# Patient Record
Sex: FEMALE | Race: WHITE | NOT HISPANIC OR LATINO
[De-identification: names, ages, dates, MRNs, and addresses within clinical notes are randomized per-mention and may not be internally consistent; named-entity substitution may affect disease eponyms.]

---

## 2017-09-29 PROBLEM — Z00.00 ENCOUNTER FOR PREVENTIVE HEALTH EXAMINATION: Status: ACTIVE | Noted: 2017-09-29

## 2017-10-03 ENCOUNTER — APPOINTMENT (OUTPATIENT)
Dept: CARDIOLOGY | Facility: CLINIC | Age: 37
End: 2017-10-03

## 2017-10-03 VITALS
SYSTOLIC BLOOD PRESSURE: 118 MMHG | BODY MASS INDEX: 39.43 KG/M2 | HEART RATE: 88 BPM | WEIGHT: 193 LBS | HEIGHT: 58.5 IN | DIASTOLIC BLOOD PRESSURE: 80 MMHG

## 2017-10-03 DIAGNOSIS — Z82.49 FAMILY HISTORY OF ISCHEMIC HEART DISEASE AND OTHER DISEASES OF THE CIRCULATORY SYSTEM: ICD-10-CM

## 2017-10-10 ENCOUNTER — APPOINTMENT (OUTPATIENT)
Dept: CARDIOLOGY | Facility: CLINIC | Age: 37
End: 2017-10-10

## 2017-11-02 ENCOUNTER — APPOINTMENT (OUTPATIENT)
Dept: CARDIOLOGY | Facility: CLINIC | Age: 37
End: 2017-11-02

## 2017-11-02 VITALS
WEIGHT: 196 LBS | HEIGHT: 58 IN | DIASTOLIC BLOOD PRESSURE: 80 MMHG | BODY MASS INDEX: 41.14 KG/M2 | SYSTOLIC BLOOD PRESSURE: 106 MMHG

## 2017-11-17 ENCOUNTER — MEDICATION RENEWAL (OUTPATIENT)
Age: 37
End: 2017-11-17

## 2018-12-06 ENCOUNTER — APPOINTMENT (OUTPATIENT)
Dept: CARDIOLOGY | Facility: CLINIC | Age: 38
End: 2018-12-06

## 2018-12-06 VITALS
SYSTOLIC BLOOD PRESSURE: 98 MMHG | HEART RATE: 78 BPM | BODY MASS INDEX: 42.19 KG/M2 | HEIGHT: 58 IN | WEIGHT: 201 LBS | DIASTOLIC BLOOD PRESSURE: 62 MMHG

## 2018-12-06 NOTE — REASON FOR VISIT
[Initial Evaluation] : an initial evaluation of [FreeTextEntry2] : htn and atypical chest pain [FreeTextEntry1] : htn\par sob\par obesity\par cardiac evaluation\par no new complaints\par echo shows normal lv function\par bp stable on hctz\par anxiety symptoms \par hyperventilating\par no syncope\par no palps

## 2019-08-09 ENCOUNTER — OUTPATIENT (OUTPATIENT)
Dept: OUTPATIENT SERVICES | Facility: HOSPITAL | Age: 39
LOS: 1 days | Discharge: HOME | End: 2019-08-09
Payer: COMMERCIAL

## 2019-08-09 DIAGNOSIS — N64.4 MASTODYNIA: ICD-10-CM

## 2019-08-09 DIAGNOSIS — N63.10 UNSPECIFIED LUMP IN THE RIGHT BREAST, UNSPECIFIED QUADRANT: ICD-10-CM

## 2019-08-09 PROCEDURE — 76642 ULTRASOUND BREAST LIMITED: CPT | Mod: 26,RT

## 2019-08-09 PROCEDURE — G0279: CPT | Mod: 26

## 2019-08-09 PROCEDURE — 77066 DX MAMMO INCL CAD BI: CPT | Mod: 26

## 2020-09-02 ENCOUNTER — APPOINTMENT (OUTPATIENT)
Dept: CARDIOLOGY | Facility: CLINIC | Age: 40
End: 2020-09-02
Payer: COMMERCIAL

## 2020-09-02 VITALS
BODY MASS INDEX: 40.72 KG/M2 | SYSTOLIC BLOOD PRESSURE: 120 MMHG | HEIGHT: 58 IN | HEART RATE: 73 BPM | TEMPERATURE: 97.4 F | WEIGHT: 194 LBS | DIASTOLIC BLOOD PRESSURE: 84 MMHG

## 2020-09-02 VITALS — HEIGHT: 58 IN

## 2020-09-02 VITALS — WEIGHT: 194 LBS | HEIGHT: 58 IN | TEMPERATURE: 97.3 F | BODY MASS INDEX: 40.72 KG/M2

## 2020-09-02 PROCEDURE — 99214 OFFICE O/P EST MOD 30 MIN: CPT

## 2020-09-02 PROCEDURE — 93000 ELECTROCARDIOGRAM COMPLETE: CPT

## 2020-09-02 NOTE — PHYSICAL EXAM
[Well Groomed] : well groomed [Normal Appearance] : normal appearance [General Appearance - Well Developed] : well developed [No Deformities] : no deformities [General Appearance - In No Acute Distress] : no acute distress [General Appearance - Well Nourished] : well nourished [Eyelids - No Xanthelasma] : the eyelids demonstrated no xanthelasmas [Normal Conjunctiva] : the conjunctiva exhibited no abnormalities [Normal Oral Mucosa] : normal oral mucosa [No Oral Pallor] : no oral pallor [No Oral Cyanosis] : no oral cyanosis [Normal Jugular Venous A Waves Present] : normal jugular venous A waves present [Normal Jugular Venous V Waves Present] : normal jugular venous V waves present [No Jugular Venous Yuen A Waves] : no jugular venous yuen A waves [Auscultation Breath Sounds / Voice Sounds] : lungs were clear to auscultation bilaterally [Exaggerated Use Of Accessory Muscles For Inspiration] : no accessory muscle use [Respiration, Rhythm And Depth] : normal respiratory rhythm and effort [Heart Sounds] : normal S1 and S2 [Murmurs] : no murmurs present [Heart Rate And Rhythm] : heart rate and rhythm were normal [Abdomen Tenderness] : non-tender [Abdomen Soft] : soft [Gait - Sufficient For Exercise Testing] : the gait was sufficient for exercise testing [Abdomen Mass (___ Cm)] : no abdominal mass palpated [Abnormal Walk] : normal gait [Nail Clubbing] : no clubbing of the fingernails [Petechial Hemorrhages (___cm)] : no petechial hemorrhages [Cyanosis, Localized] : no localized cyanosis [No Venous Stasis] : no venous stasis [Skin Color & Pigmentation] : normal skin color and pigmentation [] : no rash [Skin Lesions] : no skin lesions [No Xanthoma] : no  xanthoma was observed [No Skin Ulcers] : no skin ulcer [Affect] : the affect was normal [Oriented To Time, Place, And Person] : oriented to person, place, and time [Mood] : the mood was normal [No Anxiety] : not feeling anxious

## 2021-03-18 ENCOUNTER — OUTPATIENT (OUTPATIENT)
Dept: OUTPATIENT SERVICES | Facility: HOSPITAL | Age: 41
LOS: 1 days | Discharge: HOME | End: 2021-03-18
Payer: MEDICARE

## 2021-03-18 DIAGNOSIS — N64.4 MASTODYNIA: ICD-10-CM

## 2021-03-18 PROCEDURE — 77067 SCR MAMMO BI INCL CAD: CPT | Mod: 26

## 2021-03-18 PROCEDURE — 77063 BREAST TOMOSYNTHESIS BI: CPT | Mod: 26

## 2021-03-19 ENCOUNTER — OUTPATIENT (OUTPATIENT)
Dept: OUTPATIENT SERVICES | Facility: HOSPITAL | Age: 41
LOS: 1 days | Discharge: HOME | End: 2021-03-19
Payer: MEDICARE

## 2021-03-19 DIAGNOSIS — R92.2 INCONCLUSIVE MAMMOGRAM: ICD-10-CM

## 2021-03-19 PROCEDURE — 76641 ULTRASOUND BREAST COMPLETE: CPT | Mod: 26,50

## 2021-12-30 ENCOUNTER — OUTPATIENT (OUTPATIENT)
Dept: OUTPATIENT SERVICES | Facility: HOSPITAL | Age: 41
LOS: 1 days | Discharge: HOME | End: 2021-12-30
Payer: COMMERCIAL

## 2021-12-30 DIAGNOSIS — N64.4 MASTODYNIA: ICD-10-CM

## 2021-12-30 DIAGNOSIS — N63.10 UNSPECIFIED LUMP IN THE RIGHT BREAST, UNSPECIFIED QUADRANT: ICD-10-CM

## 2021-12-30 PROCEDURE — 77065 DX MAMMO INCL CAD UNI: CPT | Mod: 26,RT

## 2021-12-30 PROCEDURE — 76642 ULTRASOUND BREAST LIMITED: CPT | Mod: 26,RT

## 2021-12-30 PROCEDURE — G0279: CPT | Mod: 26

## 2022-08-24 ENCOUNTER — APPOINTMENT (OUTPATIENT)
Dept: CARDIOLOGY | Facility: CLINIC | Age: 42
End: 2022-08-24

## 2022-08-24 VITALS
DIASTOLIC BLOOD PRESSURE: 78 MMHG | BODY MASS INDEX: 39.67 KG/M2 | HEIGHT: 58 IN | SYSTOLIC BLOOD PRESSURE: 126 MMHG | HEART RATE: 90 BPM | WEIGHT: 189 LBS

## 2022-08-24 PROCEDURE — 93000 ELECTROCARDIOGRAM COMPLETE: CPT

## 2022-08-24 PROCEDURE — 99214 OFFICE O/P EST MOD 30 MIN: CPT | Mod: 25

## 2022-08-24 NOTE — DISCUSSION/SUMMARY
[FreeTextEntry1] : Return visit 4 months\par Echocardiogram\par A diary of blood pressure readings 2 or 3 times a day\par DC cardiac blood pressure medicines.

## 2022-08-24 NOTE — REASON FOR VISIT
[FreeTextEntry1] : htn\par sob\par obesity\par cardiac evaluation\par no new complaints\par echo shows normal lv function\par bp stable on hctz\par anxiety symptoms \par hyperventilating\par no syncope\par no palps\par \par The patient now is 6 weeks pregnant and advised cardiac follow-up\par She presently denies cardiac symptoms..\par Denies chest pain or exertional shortness of breath..\par \par Her blood pressure is normal and she desires to stop her hydrochlorothiazide\par \par EKG is normal sinus rhythm [Initial Evaluation] : an initial evaluation of [FreeTextEntry2] : htn and atypical chest pain

## 2022-08-24 NOTE — PHYSICAL EXAM
[Well Developed] : well developed [Well Nourished] : well nourished [No Acute Distress] : no acute distress [Normal Venous Pressure] : normal venous pressure [No Carotid Bruit] : no carotid bruit [Normal S1, S2] : normal S1, S2 [No Murmur] : no murmur [No Rub] : no rub [No Gallop] : no gallop [Clear Lung Fields] : clear lung fields [Good Air Entry] : good air entry [No Respiratory Distress] : no respiratory distress  [Soft] : abdomen soft [Non Tender] : non-tender [No Masses/organomegaly] : no masses/organomegaly [Normal Bowel Sounds] : normal bowel sounds [Normal Gait] : normal gait [No Edema] : no edema [No Cyanosis] : no cyanosis [No Clubbing] : no clubbing [No Varicosities] : no varicosities [No Rash] : no rash [No Skin Lesions] : no skin lesions [Moves all extremities] : moves all extremities [No Focal Deficits] : no focal deficits [Normal Speech] : normal speech [Alert and Oriented] : alert and oriented [Normal memory] : normal memory [General Appearance - Well Developed] : well developed [Normal Appearance] : normal appearance [Well Groomed] : well groomed [General Appearance - Well Nourished] : well nourished [No Deformities] : no deformities [General Appearance - In No Acute Distress] : no acute distress [Normal Conjunctiva] : the conjunctiva exhibited no abnormalities [Eyelids - No Xanthelasma] : the eyelids demonstrated no xanthelasmas [Normal Oral Mucosa] : normal oral mucosa [No Oral Pallor] : no oral pallor [No Oral Cyanosis] : no oral cyanosis [Normal Jugular Venous A Waves Present] : normal jugular venous A waves present [Normal Jugular Venous V Waves Present] : normal jugular venous V waves present [No Jugular Venous Yuen A Waves] : no jugular venous yuen A waves [Respiration, Rhythm And Depth] : normal respiratory rhythm and effort [Exaggerated Use Of Accessory Muscles For Inspiration] : no accessory muscle use [Auscultation Breath Sounds / Voice Sounds] : lungs were clear to auscultation bilaterally [Heart Rate And Rhythm] : heart rate and rhythm were normal [Heart Sounds] : normal S1 and S2 [Murmurs] : no murmurs present [Abdomen Soft] : soft [Abdomen Tenderness] : non-tender [Abdomen Mass (___ Cm)] : no abdominal mass palpated [Abnormal Walk] : normal gait [Gait - Sufficient For Exercise Testing] : the gait was sufficient for exercise testing [Nail Clubbing] : no clubbing of the fingernails [Cyanosis, Localized] : no localized cyanosis [Petechial Hemorrhages (___cm)] : no petechial hemorrhages [Skin Color & Pigmentation] : normal skin color and pigmentation [] : no rash [No Venous Stasis] : no venous stasis [Skin Lesions] : no skin lesions [No Skin Ulcers] : no skin ulcer [No Xanthoma] : no  xanthoma was observed [Oriented To Time, Place, And Person] : oriented to person, place, and time [Affect] : the affect was normal [Mood] : the mood was normal [No Anxiety] : not feeling anxious

## 2022-09-20 ENCOUNTER — APPOINTMENT (OUTPATIENT)
Dept: CARDIOLOGY | Facility: CLINIC | Age: 42
End: 2022-09-20

## 2022-10-24 ENCOUNTER — APPOINTMENT (OUTPATIENT)
Dept: CARDIOLOGY | Facility: CLINIC | Age: 42
End: 2022-10-24

## 2022-10-24 DIAGNOSIS — Z78.9 OTHER SPECIFIED HEALTH STATUS: ICD-10-CM

## 2022-10-24 PROCEDURE — 93306 TTE W/DOPPLER COMPLETE: CPT

## 2022-10-26 PROBLEM — Z78.9 NON-SMOKER: Status: ACTIVE | Noted: 2017-11-02

## 2022-12-13 ENCOUNTER — OUTPATIENT (OUTPATIENT)
Dept: OUTPATIENT SERVICES | Facility: HOSPITAL | Age: 42
LOS: 1 days | Discharge: HOME | End: 2022-12-13

## 2022-12-13 ENCOUNTER — RESULT CHARGE (OUTPATIENT)
Age: 42
End: 2022-12-13

## 2022-12-13 ENCOUNTER — APPOINTMENT (OUTPATIENT)
Dept: CARDIOLOGY | Facility: CLINIC | Age: 42
End: 2022-12-13

## 2022-12-13 VITALS
DIASTOLIC BLOOD PRESSURE: 60 MMHG | WEIGHT: 196 LBS | BODY MASS INDEX: 41.14 KG/M2 | HEART RATE: 89 BPM | HEIGHT: 58 IN | SYSTOLIC BLOOD PRESSURE: 110 MMHG

## 2022-12-13 DIAGNOSIS — N63.10 UNSPECIFIED LUMP IN THE RIGHT BREAST, UNSPECIFIED QUADRANT: ICD-10-CM

## 2022-12-13 PROCEDURE — 99214 OFFICE O/P EST MOD 30 MIN: CPT | Mod: 25

## 2022-12-13 PROCEDURE — 93000 ELECTROCARDIOGRAM COMPLETE: CPT

## 2022-12-13 PROCEDURE — 76641 ULTRASOUND BREAST COMPLETE: CPT | Mod: 26,50

## 2022-12-13 NOTE — REASON FOR VISIT
[Initial Evaluation] : an initial evaluation of [FreeTextEntry1] : htn\par sob\par obesity\par cardiac evaluation\par no new complaints\par echo shows normal lv function\par bp stable on hctz\par anxiety symptoms \par hyperventilating\par no syncope\par no palps\par \par The patient now is 6 weeks pregnant and advised cardiac follow-up\par She presently denies cardiac symptoms..\par Denies chest pain or exertional shortness of breath..\par \par Her blood pressure is normal and she desires to stop her hydrochlorothiazide\par \par EKG is normal sinus rhythm [FreeTextEntry2] : htn and atypical chest pain

## 2023-04-25 ENCOUNTER — APPOINTMENT (OUTPATIENT)
Dept: CARDIOLOGY | Facility: CLINIC | Age: 43
End: 2023-04-25
Payer: COMMERCIAL

## 2023-04-25 ENCOUNTER — RESULT CHARGE (OUTPATIENT)
Age: 43
End: 2023-04-25

## 2023-04-25 VITALS
BODY MASS INDEX: 38.2 KG/M2 | WEIGHT: 182 LBS | HEART RATE: 60 BPM | SYSTOLIC BLOOD PRESSURE: 120 MMHG | DIASTOLIC BLOOD PRESSURE: 80 MMHG | HEIGHT: 58 IN

## 2023-04-25 DIAGNOSIS — I10 ESSENTIAL (PRIMARY) HYPERTENSION: ICD-10-CM

## 2023-04-25 DIAGNOSIS — R07.9 CHEST PAIN, UNSPECIFIED: ICD-10-CM

## 2023-04-25 PROCEDURE — 99214 OFFICE O/P EST MOD 30 MIN: CPT | Mod: 25

## 2023-04-25 PROCEDURE — 93000 ELECTROCARDIOGRAM COMPLETE: CPT

## 2023-04-25 NOTE — REASON FOR VISIT
[Initial Evaluation] : an initial evaluation of [FreeTextEntry1] : htn\par sob\par obesity\par cardiac evaluation\par no new complaints\par echo shows normal lv function\par bp stable on hctz\par anxiety symptoms \par hyperventilating\par no syncope\par no palps\par \par The patient now is 6 weeks pregnant and advised cardiac follow-up\par She presently denies cardiac symptoms..\par Denies chest pain or exertional shortness of breath..\par \par Her blood pressure is normal and she desires to stop her hydrochlorothiazide\par \par EKG is normal sinus rhythm\par \par d/c labetolol [FreeTextEntry2] : htn and atypical chest pain

## 2023-06-22 ENCOUNTER — APPOINTMENT (OUTPATIENT)
Dept: CARDIOLOGY | Facility: CLINIC | Age: 43
End: 2023-06-22

## 2023-07-05 ENCOUNTER — APPOINTMENT (OUTPATIENT)
Dept: CARDIOLOGY | Facility: CLINIC | Age: 43
End: 2023-07-05

## 2023-10-02 NOTE — PHYSICAL EXAM
[General Appearance - Well Developed] : well developed [Normal Appearance] : normal appearance [Well Groomed] : well groomed [General Appearance - Well Nourished] : well nourished [No Deformities] : no deformities [General Appearance - In No Acute Distress] : no acute distress [Normal Conjunctiva] : the conjunctiva exhibited no abnormalities [Eyelids - No Xanthelasma] : the eyelids demonstrated no xanthelasmas [Normal Oral Mucosa] : normal oral mucosa [No Oral Pallor] : no oral pallor [No Oral Cyanosis] : no oral cyanosis [Normal Jugular Venous A Waves Present] : normal jugular venous A waves present [Normal Jugular Venous V Waves Present] : normal jugular venous V waves present [No Jugular Venous Yuen A Waves] : no jugular venous yuen A waves [Respiration, Rhythm And Depth] : normal respiratory rhythm and effort [Exaggerated Use Of Accessory Muscles For Inspiration] : no accessory muscle use [Auscultation Breath Sounds / Voice Sounds] : lungs were clear to auscultation bilaterally [Heart Rate And Rhythm] : heart rate and rhythm were normal [Heart Sounds] : normal S1 and S2 [Murmurs] : no murmurs present [Abdomen Soft] : soft [Abdomen Tenderness] : non-tender [Abdomen Mass (___ Cm)] : no abdominal mass palpated [Abnormal Walk] : normal gait [Gait - Sufficient For Exercise Testing] : the gait was sufficient for exercise testing [Nail Clubbing] : no clubbing of the fingernails [Cyanosis, Localized] : no localized cyanosis [Petechial Hemorrhages (___cm)] : no petechial hemorrhages [Skin Color & Pigmentation] : normal skin color and pigmentation [] : no rash [No Venous Stasis] : no venous stasis [Skin Lesions] : no skin lesions [No Skin Ulcers] : no skin ulcer [No Xanthoma] : no  xanthoma was observed [Oriented To Time, Place, And Person] : oriented to person, place, and time [Affect] : the affect was normal [Mood] : the mood was normal [No Anxiety] : not feeling anxious room air

## 2024-04-02 ENCOUNTER — OUTPATIENT (OUTPATIENT)
Dept: OUTPATIENT SERVICES | Facility: HOSPITAL | Age: 44
LOS: 1 days | End: 2024-04-02
Payer: COMMERCIAL

## 2024-04-02 DIAGNOSIS — R92.2 INCONCLUSIVE MAMMOGRAM: ICD-10-CM

## 2024-04-02 DIAGNOSIS — Z12.31 ENCOUNTER FOR SCREENING MAMMOGRAM FOR MALIGNANT NEOPLASM OF BREAST: ICD-10-CM

## 2024-04-02 PROCEDURE — 77063 BREAST TOMOSYNTHESIS BI: CPT | Mod: 26

## 2024-04-02 PROCEDURE — 77067 SCR MAMMO BI INCL CAD: CPT

## 2024-04-02 PROCEDURE — 77067 SCR MAMMO BI INCL CAD: CPT | Mod: 26

## 2024-04-02 PROCEDURE — 76641 ULTRASOUND BREAST COMPLETE: CPT | Mod: 50

## 2024-04-02 PROCEDURE — 76641 ULTRASOUND BREAST COMPLETE: CPT | Mod: 26,50

## 2024-04-02 PROCEDURE — 77063 BREAST TOMOSYNTHESIS BI: CPT

## 2024-04-03 DIAGNOSIS — R92.2 INCONCLUSIVE MAMMOGRAM: ICD-10-CM

## 2024-04-03 DIAGNOSIS — Z12.31 ENCOUNTER FOR SCREENING MAMMOGRAM FOR MALIGNANT NEOPLASM OF BREAST: ICD-10-CM

## 2024-06-26 ENCOUNTER — APPOINTMENT (OUTPATIENT)
Dept: ORTHOPEDIC SURGERY | Facility: CLINIC | Age: 44
End: 2024-06-26
Payer: OTHER MISCELLANEOUS

## 2024-06-26 ENCOUNTER — NON-APPOINTMENT (OUTPATIENT)
Age: 44
End: 2024-06-26

## 2024-06-26 VITALS — BODY MASS INDEX: 37.79 KG/M2 | WEIGHT: 180 LBS | HEIGHT: 58 IN

## 2024-06-26 DIAGNOSIS — S46.912A STRAIN OF UNSPECIFIED MUSCLE, FASCIA AND TENDON AT SHOULDER AND UPPER ARM LEVEL, LEFT ARM, INITIAL ENCOUNTER: ICD-10-CM

## 2024-06-26 DIAGNOSIS — S16.1XXA STRAIN OF MUSCLE, FASCIA AND TENDON AT NECK LEVEL, INITIAL ENCOUNTER: ICD-10-CM

## 2024-06-26 DIAGNOSIS — S39.012A STRAIN OF MUSCLE, FASCIA AND TENDON OF LOWER BACK, INITIAL ENCOUNTER: ICD-10-CM

## 2024-06-26 PROCEDURE — 72110 X-RAY EXAM L-2 SPINE 4/>VWS: CPT

## 2024-06-26 PROCEDURE — 99213 OFFICE O/P EST LOW 20 MIN: CPT | Mod: ACP

## 2024-06-26 PROCEDURE — 73030 X-RAY EXAM OF SHOULDER: CPT | Mod: LT

## 2024-06-26 PROCEDURE — 72040 X-RAY EXAM NECK SPINE 2-3 VW: CPT

## 2024-06-26 RX ORDER — METHOCARBAMOL 500 MG/1
500 TABLET, FILM COATED ORAL
Qty: 28 | Refills: 0 | Status: ACTIVE | COMMUNITY
Start: 2024-06-26 | End: 1900-01-01

## 2024-06-26 RX ORDER — MELOXICAM 15 MG/1
15 TABLET ORAL
Qty: 30 | Refills: 0 | Status: ACTIVE | COMMUNITY
Start: 2024-06-26 | End: 1900-01-01

## 2024-07-12 ENCOUNTER — APPOINTMENT (OUTPATIENT)
Dept: MRI IMAGING | Facility: CLINIC | Age: 44
End: 2024-07-12

## 2024-07-25 ENCOUNTER — APPOINTMENT (OUTPATIENT)
Dept: PAIN MANAGEMENT | Facility: CLINIC | Age: 44
End: 2024-07-25
Payer: OTHER MISCELLANEOUS

## 2024-07-25 DIAGNOSIS — M54.12 RADICULOPATHY, CERVICAL REGION: ICD-10-CM

## 2024-07-25 DIAGNOSIS — M54.50 LOW BACK PAIN, UNSPECIFIED: ICD-10-CM

## 2024-07-25 PROCEDURE — 99204 OFFICE O/P NEW MOD 45 MIN: CPT

## 2024-07-25 RX ORDER — METHYLPREDNISOLONE 4 MG/1
4 TABLET ORAL
Qty: 1 | Refills: 0 | Status: ACTIVE | COMMUNITY
Start: 2024-07-25 | End: 1900-01-01

## 2024-07-25 NOTE — PHYSICAL EXAM
[de-identified] : BACK- Inspection:  erythema (-) ecchymosis (-)       Special Tests:  SLR: R (-) ; L (+)     Gait:  non- antalgic gait   NECK- [place] +RT.

## 2024-07-25 NOTE — DISCUSSION/SUMMARY
[de-identified] : A discussion regarding available pain management treatment options occurred with the patient. These included interventional, rehabilitative, pharmacological, and alternative modalities. We will proceed with the following:  Interventional treatment options: - Potential treatment options such as further conservative therapy & injections were briefly discussed.   Rehabilitative options: -Participation in active HEP was discussed and printed. I gave the patient a list of home exercises to do for pain reduction and overall improvement in functional status including but not limited to active neck rotation, active neck side bend, neck flexion, neck extension, chin tuck, scalene stretch, isometric neck flexion, isometric neck extension, isometric neck side bend, head lift/neck curl, head lift/neck side bend, neck extension on hands and knees, and scapula squeeze.   Medication based treatment options: - ordered a Medrol Dose Pack 4mg, use as directed for a duration of 6 days.   Complementary treatment options: - Patient was advised to stay away from any heavy lifting. If needed, she was advised to squat and not bend forward. - Initiate physician directed activity and lifestyle modifications.  Follow up in 4-6 weeks for reassessment.  I, Judith Bolden, attest that this documentation has been prepared under the direction and in the presence of Provider Luke Cardenas, DO The documentation recorded by the scribe, in my presence, accurately reflects the service I personally performed, and the decisions made by me with my edits as appropriate.   Best Regards, Luke Cardenas D.O.

## 2024-07-25 NOTE — HISTORY OF PRESENT ILLNESS
[FreeTextEntry1] : HISTORY OF PRESENT ILLNESS: Mrs. Hansen is a 43-year-old female complaining of neck, lower back, left shoulder pain after an injury that occurred at work on June 22, 2024. 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 Patient states she has a history of prior back issues, she has had injections in the past. Taking Tylenol/Motrin. Reports intermittent numbness and tingling into her hands. She notes she gets severe headaches which hinders her sleep. Patient describes the pain as moderate to severe.  During the last month the pain has been nearly constant with symptoms worsening in no typical pattern.   As for her low back pain that is sharp, burning, tingling that does radiates down the left worse than right leg that is 9/10 pain intensity. Sitting and bending worsens the pain, standing improves the pain. NSAIDs has been tried without relief. Patient denies bowel/bladder incontinence, weakness, falls.  ACTIVITIES: Patient uses no assisted walking device at this time.  Patient has difficulty performing household chores, going to work, doing yardwork or shopping, participating in recreational activities & exercise at this time.   Prior Pain Medications: Meloxicam, Methocarbamol.

## 2024-07-25 NOTE — DATA REVIEWED
[FreeTextEntry1] : X-ray cervical spine straightening to the cervical curvature X-ray lumbar spine no obvious acute bony normality X-ray left shoulder subtle irregularity around the distal clavicle AC joint is likely chronic in nature otherwise no obvious fracture or dislocation

## 2024-07-26 ENCOUNTER — APPOINTMENT (OUTPATIENT)
Dept: ORTHOPEDIC SURGERY | Facility: CLINIC | Age: 44
End: 2024-07-26

## 2024-08-06 ENCOUNTER — OUTPATIENT (OUTPATIENT)
Dept: OUTPATIENT SERVICES | Facility: HOSPITAL | Age: 44
LOS: 1 days | End: 2024-08-06
Payer: COMMERCIAL

## 2024-08-06 DIAGNOSIS — R92.8 OTHER ABNORMAL AND INCONCLUSIVE FINDINGS ON DIAGNOSTIC IMAGING OF BREAST: ICD-10-CM

## 2024-08-06 DIAGNOSIS — Z00.8 ENCOUNTER FOR OTHER GENERAL EXAMINATION: ICD-10-CM

## 2024-08-06 PROCEDURE — A9579: CPT

## 2024-08-06 PROCEDURE — 77049 MRI BREAST C-+ W/CAD BI: CPT

## 2024-08-06 PROCEDURE — 77049 MRI BREAST C-+ W/CAD BI: CPT | Mod: 26

## 2024-08-07 DIAGNOSIS — R92.8 OTHER ABNORMAL AND INCONCLUSIVE FINDINGS ON DIAGNOSTIC IMAGING OF BREAST: ICD-10-CM

## 2024-08-12 ENCOUNTER — APPOINTMENT (OUTPATIENT)
Dept: ORTHOPEDIC SURGERY | Facility: CLINIC | Age: 44
End: 2024-08-12
Payer: OTHER MISCELLANEOUS

## 2024-08-12 DIAGNOSIS — S39.012A STRAIN OF MUSCLE, FASCIA AND TENDON OF LOWER BACK, INITIAL ENCOUNTER: ICD-10-CM

## 2024-08-12 PROCEDURE — 99204 OFFICE O/P NEW MOD 45 MIN: CPT

## 2024-08-12 NOTE — DISCUSSION/SUMMARY
[de-identified] : 43-year-old female with cervical and lumbar radiculopathy as well as headaches after work accident on June 22, 2024.  I think the patient would best benefit from epidural injections.  She will follow-up with pain management again.  Regards her headaches I will have her see one of our neurologist.  The patient is 100% temporarily disabled and has been advised to stay out of work.

## 2024-08-12 NOTE — HISTORY OF PRESENT ILLNESS
[de-identified] : 43-year-old female presents to me after work injury June 22, 2024.  Since then the patient has severe headaches neck pain radiating down her arms low back pain radiating down her legs.  She has no loss of bladder or bowel.  She has been in physical therapy and this is not helping her.  She has been taking oral steroids NSAIDs and muscle relaxants with no relief.

## 2024-08-12 NOTE — IMAGING
[de-identified] : TTP midline cervical spine and paraspinal musculature   Strength                                                                     Deltoid   Right: 5/5; Left: 5/5                      Biceps   Right: 5/5; Left: 5/5                   Triceps        Right: 5/5; Left: 5/5                                 Wrist Extensors     Right: 5/5; Left: 5/5 Finger Flexors     Right: 5/5; Left: 5/5 IO    Right: 5/5; Left: 5/5  Sensation C5   Right: 2/2; Left: 2/2 C6   Right: 2/2; Left: 2/2 C7   Right: 2/2; Left: 2/2 C8   Right: 2/2; Left: 2/2 T1   Right: 2/2; Left: 2/2  Reflexes Biceps   Right: 2+; Left 2+ Triceps   Right: 2+; Left 2+ Cleveland's  Right: Negative; L: Negative TTP midline spine and paraspinal musculature  Strength                                          Hip flexor   Right: 5/5; Left: 5/5                              Knee extensor     Right: 5/5; Left: 5/5                      Ankle dorsiflexion   Right: 5/5; Left: 5/5                   EHL           Right: 5/5; Left: 5/5                                 Ankle plantarflexion       Right: 5/5; Left: 5/5  Sensation L1   Right: 2/2; Left: 2/2 L2   Right: 2/2; Left: 2/2 L3   Right: 2/2; Left: 2/2 L4   Right: 2/2; Left: 2/2 L5   Right: 2/2; Left: 2/2 S1   Right: 2/2; Left: 2/2  Reflexes Patella   Right: 2+; Left 2+ Achilles   Right: 2+; Left 2+ Clonus  Right: absent; L: absent

## 2024-08-12 NOTE — DATA REVIEWED
[FreeTextEntry1] : I reviewed the patient's MRI of her cervical and lumbar spine reports.  Those are attached to the chart.

## 2024-08-23 ENCOUNTER — APPOINTMENT (OUTPATIENT)
Dept: PAIN MANAGEMENT | Facility: CLINIC | Age: 44
End: 2024-08-23
Payer: OTHER MISCELLANEOUS

## 2024-08-23 DIAGNOSIS — M54.50 LOW BACK PAIN, UNSPECIFIED: ICD-10-CM

## 2024-08-23 DIAGNOSIS — M54.12 RADICULOPATHY, CERVICAL REGION: ICD-10-CM

## 2024-08-23 PROCEDURE — 99214 OFFICE O/P EST MOD 30 MIN: CPT

## 2024-08-23 RX ORDER — PREGABALIN 50 MG/1
50 CAPSULE ORAL
Qty: 60 | Refills: 0 | Status: ACTIVE | COMMUNITY
Start: 2024-08-23 | End: 1900-01-01

## 2024-08-23 RX ORDER — OXYCODONE AND ACETAMINOPHEN 5; 325 MG/1; MG/1
5-325 TABLET ORAL
Qty: 28 | Refills: 0 | Status: ACTIVE | COMMUNITY
Start: 2024-08-23 | End: 1900-01-01

## 2024-08-23 NOTE — DISCUSSION/SUMMARY
[de-identified] : A discussion regarding available pain management treatment options occurred with the patient. These included interventional, rehabilitative, pharmacological, and alternative modalities. We will proceed with the following:  Interventional treatment options: 1. Patient will proceed with a TANISHA C7-T1 X3 MAC. Treatment options were discussed with the patient. The patient has been having persistent neck pain with radiculopathy with minimal improvement with conservative therapies. The patient was given the option to proceed with a cervical epidural steroid injection to try to get some pain relief.  If we are unable to get pain relief with this procedure, we will reassess our options before proceeding.      The risks and benefits were discussed which included bleeding, infection, nerve injury, no pain relief or worse, increased pain. All questions were answered, and the patient will schedule for the injection on the next available date.  Risk, benefits, pros and cons of procedure were explained to the patient using models and diagrams and their questions were answered.  The patient has severe anxiety of procedures that necessitates monitored anesthesia care (MAC). The procedure performed will be close to major nerves, arteries, and spinal cord and/or joint structures. Due to the proximity of these structures, we need the patient to be still during the procedure.  With the help of MAC, this will be safely achieved and decrease the risk of any complications.   Rehabilitative options: - c/w participation in active HEP as discussed. I gave the patient a list of home exercises to do for pain reduction and overall improvement in functional status including but not limited to active neck rotation, active neck side bend, neck flexion, neck extension, chin tuck, scalene stretch, isometric neck flexion, isometric neck extension, isometric neck side bend, head lift/neck curl, head lift/neck side bend, neck extension on hands and knees, and scapula squeeze.   Medication based treatment options: - ordered Lyrica 50mg BID for a duration of 4 weeks.  - ordered oxycodone-acetaminophen 5-325mg BID for a duration of 2 weeks.   Complementary treatment options: - Patient was advised to stay away from any heavy lifting. If needed, she was advised to squat and not bend forward. - Physician directed activity and lifestyle modifications.  Follow up 1-2 weeks post injection for assessment of efficacy and further recommendations.  IJudith, attest that this documentation has been prepared under the direction and in the presence of Provider Luke Schirripa, DO The documentation recorded by the scribe, in my presence, accurately reflects the service I personally performed, and the decisions made by me with my edits as appropriate.   Best Regards, Luke Cardenas D.O.

## 2024-08-30 ENCOUNTER — INPATIENT (INPATIENT)
Facility: HOSPITAL | Age: 44
LOS: 2 days | Discharge: HOME | DRG: 313 | End: 2024-09-02
Attending: INTERNAL MEDICINE | Admitting: INTERNAL MEDICINE
Payer: COMMERCIAL

## 2024-08-30 VITALS
HEART RATE: 78 BPM | WEIGHT: 184.09 LBS | DIASTOLIC BLOOD PRESSURE: 91 MMHG | OXYGEN SATURATION: 100 % | SYSTOLIC BLOOD PRESSURE: 141 MMHG | TEMPERATURE: 98 F | RESPIRATION RATE: 17 BRPM

## 2024-08-30 DIAGNOSIS — R07.9 CHEST PAIN, UNSPECIFIED: ICD-10-CM

## 2024-08-30 LAB
ALBUMIN SERPL ELPH-MCNC: 4.6 G/DL — SIGNIFICANT CHANGE UP (ref 3.5–5.2)
ALP SERPL-CCNC: 86 U/L — SIGNIFICANT CHANGE UP (ref 30–115)
ALT FLD-CCNC: 25 U/L — SIGNIFICANT CHANGE UP (ref 0–41)
ANION GAP SERPL CALC-SCNC: 9 MMOL/L — SIGNIFICANT CHANGE UP (ref 7–14)
AST SERPL-CCNC: 14 U/L — SIGNIFICANT CHANGE UP (ref 0–41)
BASOPHILS # BLD AUTO: 0.05 K/UL — SIGNIFICANT CHANGE UP (ref 0–0.2)
BASOPHILS NFR BLD AUTO: 0.8 % — SIGNIFICANT CHANGE UP (ref 0–1)
BILIRUB SERPL-MCNC: 0.5 MG/DL — SIGNIFICANT CHANGE UP (ref 0.2–1.2)
BUN SERPL-MCNC: 13 MG/DL — SIGNIFICANT CHANGE UP (ref 10–20)
CALCIUM SERPL-MCNC: 9.6 MG/DL — SIGNIFICANT CHANGE UP (ref 8.4–10.5)
CHLORIDE SERPL-SCNC: 103 MMOL/L — SIGNIFICANT CHANGE UP (ref 98–110)
CO2 SERPL-SCNC: 26 MMOL/L — SIGNIFICANT CHANGE UP (ref 17–32)
CREAT SERPL-MCNC: 0.7 MG/DL — SIGNIFICANT CHANGE UP (ref 0.7–1.5)
EGFR: 110 ML/MIN/1.73M2 — SIGNIFICANT CHANGE UP
EOSINOPHIL # BLD AUTO: 0.24 K/UL — SIGNIFICANT CHANGE UP (ref 0–0.7)
EOSINOPHIL NFR BLD AUTO: 3.7 % — SIGNIFICANT CHANGE UP (ref 0–8)
GLUCOSE SERPL-MCNC: 93 MG/DL — SIGNIFICANT CHANGE UP (ref 70–99)
HCG SERPL QL: NEGATIVE — SIGNIFICANT CHANGE UP
HCT VFR BLD CALC: 41.2 % — SIGNIFICANT CHANGE UP (ref 37–47)
HGB BLD-MCNC: 14.3 G/DL — SIGNIFICANT CHANGE UP (ref 12–16)
IMM GRANULOCYTES NFR BLD AUTO: 0.3 % — SIGNIFICANT CHANGE UP (ref 0.1–0.3)
LYMPHOCYTES # BLD AUTO: 1.66 K/UL — SIGNIFICANT CHANGE UP (ref 1.2–3.4)
LYMPHOCYTES # BLD AUTO: 25.5 % — SIGNIFICANT CHANGE UP (ref 20.5–51.1)
MCHC RBC-ENTMCNC: 29.9 PG — SIGNIFICANT CHANGE UP (ref 27–31)
MCHC RBC-ENTMCNC: 34.7 G/DL — SIGNIFICANT CHANGE UP (ref 32–37)
MCV RBC AUTO: 86 FL — SIGNIFICANT CHANGE UP (ref 81–99)
MONOCYTES # BLD AUTO: 0.37 K/UL — SIGNIFICANT CHANGE UP (ref 0.1–0.6)
MONOCYTES NFR BLD AUTO: 5.7 % — SIGNIFICANT CHANGE UP (ref 1.7–9.3)
NEUTROPHILS # BLD AUTO: 4.16 K/UL — SIGNIFICANT CHANGE UP (ref 1.4–6.5)
NEUTROPHILS NFR BLD AUTO: 64 % — SIGNIFICANT CHANGE UP (ref 42.2–75.2)
NRBC # BLD: 0 /100 WBCS — SIGNIFICANT CHANGE UP (ref 0–0)
PLATELET # BLD AUTO: 237 K/UL — SIGNIFICANT CHANGE UP (ref 130–400)
PMV BLD: 11.2 FL — HIGH (ref 7.4–10.4)
POTASSIUM SERPL-MCNC: 4.3 MMOL/L — SIGNIFICANT CHANGE UP (ref 3.5–5)
POTASSIUM SERPL-SCNC: 4.3 MMOL/L — SIGNIFICANT CHANGE UP (ref 3.5–5)
PROT SERPL-MCNC: 6.8 G/DL — SIGNIFICANT CHANGE UP (ref 6–8)
RBC # BLD: 4.79 M/UL — SIGNIFICANT CHANGE UP (ref 4.2–5.4)
RBC # FLD: 13.2 % — SIGNIFICANT CHANGE UP (ref 11.5–14.5)
SODIUM SERPL-SCNC: 138 MMOL/L — SIGNIFICANT CHANGE UP (ref 135–146)
TROPONIN T, HIGH SENSITIVITY RESULT: <6 NG/L — SIGNIFICANT CHANGE UP (ref 6–13)
WBC # BLD: 6.5 K/UL — SIGNIFICANT CHANGE UP (ref 4.8–10.8)
WBC # FLD AUTO: 6.5 K/UL — SIGNIFICANT CHANGE UP (ref 4.8–10.8)

## 2024-08-30 PROCEDURE — G0378: CPT

## 2024-08-30 PROCEDURE — 80053 COMPREHEN METABOLIC PANEL: CPT

## 2024-08-30 PROCEDURE — 80061 LIPID PANEL: CPT

## 2024-08-30 PROCEDURE — 83735 ASSAY OF MAGNESIUM: CPT

## 2024-08-30 PROCEDURE — 84443 ASSAY THYROID STIM HORMONE: CPT

## 2024-08-30 PROCEDURE — 36415 COLL VENOUS BLD VENIPUNCTURE: CPT

## 2024-08-30 PROCEDURE — 84100 ASSAY OF PHOSPHORUS: CPT

## 2024-08-30 PROCEDURE — 85025 COMPLETE CBC W/AUTO DIFF WBC: CPT

## 2024-08-30 PROCEDURE — 99285 EMERGENCY DEPT VISIT HI MDM: CPT

## 2024-08-30 PROCEDURE — 84484 ASSAY OF TROPONIN QUANT: CPT

## 2024-08-30 PROCEDURE — 93010 ELECTROCARDIOGRAM REPORT: CPT | Mod: 76

## 2024-08-30 PROCEDURE — 71046 X-RAY EXAM CHEST 2 VIEWS: CPT | Mod: 26

## 2024-08-30 PROCEDURE — 93005 ELECTROCARDIOGRAM TRACING: CPT

## 2024-08-30 PROCEDURE — 83036 HEMOGLOBIN GLYCOSYLATED A1C: CPT

## 2024-08-30 PROCEDURE — 75574 CT ANGIO HRT W/3D IMAGE: CPT | Mod: 26,MC

## 2024-08-30 RX ORDER — METOPROLOL TARTRATE 100 MG/1
100 TABLET ORAL DAILY
Refills: 0 | Status: DISCONTINUED | OUTPATIENT
Start: 2024-08-30 | End: 2024-08-30

## 2024-08-30 RX ORDER — ASPIRIN 81 MG
81 TABLET, DELAYED RELEASE (ENTERIC COATED) ORAL DAILY
Refills: 0 | Status: DISCONTINUED | OUTPATIENT
Start: 2024-08-31 | End: 2024-09-02

## 2024-08-30 RX ORDER — KETOROLAC TROMETHAMINE 30 MG/ML
30 INJECTION, SOLUTION INTRAMUSCULAR ONCE
Refills: 0 | Status: DISCONTINUED | OUTPATIENT
Start: 2024-08-30 | End: 2024-08-30

## 2024-08-30 RX ORDER — ACETAMINOPHEN 325 MG/1
650 TABLET ORAL EVERY 6 HOURS
Refills: 0 | Status: DISCONTINUED | OUTPATIENT
Start: 2024-08-30 | End: 2024-08-30

## 2024-08-30 RX ORDER — FAMOTIDINE 10 MG/ML
40 INJECTION INTRAVENOUS DAILY
Refills: 0 | Status: DISCONTINUED | OUTPATIENT
Start: 2024-08-30 | End: 2024-09-02

## 2024-08-30 RX ORDER — ACETAMINOPHEN 325 MG/1
650 TABLET ORAL ONCE
Refills: 0 | Status: COMPLETED | OUTPATIENT
Start: 2024-08-30 | End: 2024-08-30

## 2024-08-30 RX ORDER — ASPIRIN 81 MG
325 TABLET, DELAYED RELEASE (ENTERIC COATED) ORAL ONCE
Refills: 0 | Status: COMPLETED | OUTPATIENT
Start: 2024-08-30 | End: 2024-08-30

## 2024-08-30 RX ORDER — SODIUM CHLORIDE 9 MG/ML
1000 INJECTION INTRAMUSCULAR; INTRAVENOUS; SUBCUTANEOUS ONCE
Refills: 0 | Status: COMPLETED | OUTPATIENT
Start: 2024-08-30 | End: 2024-08-30

## 2024-08-30 RX ORDER — METOPROLOL TARTRATE 100 MG/1
100 TABLET ORAL ONCE
Refills: 0 | Status: COMPLETED | OUTPATIENT
Start: 2024-08-30 | End: 2024-08-30

## 2024-08-30 RX ORDER — ALPRAZOLAM 0.25 MG
0.25 TABLET ORAL EVERY 12 HOURS
Refills: 0 | Status: DISCONTINUED | OUTPATIENT
Start: 2024-08-30 | End: 2024-09-02

## 2024-08-30 RX ADMIN — Medication 325 MILLIGRAM(S): at 16:36

## 2024-08-30 RX ADMIN — METOPROLOL TARTRATE 100 MILLIGRAM(S): 100 TABLET ORAL at 12:16

## 2024-08-30 RX ADMIN — FAMOTIDINE 40 MILLIGRAM(S): 10 INJECTION INTRAVENOUS at 12:16

## 2024-08-30 RX ADMIN — ACETAMINOPHEN 650 MILLIGRAM(S): 325 TABLET ORAL at 16:35

## 2024-08-30 RX ADMIN — KETOROLAC TROMETHAMINE 30 MILLIGRAM(S): 30 INJECTION, SOLUTION INTRAMUSCULAR at 11:01

## 2024-08-30 RX ADMIN — SODIUM CHLORIDE 2000 MILLILITER(S): 9 INJECTION INTRAMUSCULAR; INTRAVENOUS; SUBCUTANEOUS at 10:25

## 2024-08-30 NOTE — ED CDU PROVIDER DISPOSITION NOTE - NSFOLLOWUPINSTRUCTIONS_ED_ALL_ED_FT
Follow-up with cardiology - Our Emergency Department Referral Coordinators will be reaching out to you in the next 24-48 hours from 9:00am to 5:00pm to schedule a follow up appointment. Please expect a phone call from the hospital in that time frame. If you do not receive a call or if you have any questions or concerns, you can reach them at   [phone].    Chest Pain    Chest pain can be caused by many different conditions which may or may not be dangerous. Causes include heartburn, lung infections, heart attack, blood clot in lungs, skin infections, strain or damage to muscle, cartilage, or bones, etc. In addition to a history and physical examination, an electrocardiogram (ECG) or other lab tests may have been performed to determine the cause of your chest pain. Follow up with your primary care provider or with a cardiologist as instructed.     SEEK IMMEDIATE MEDICAL CARE IF YOU HAVE ANY OF THE FOLLOWING SYMPTOMS: worsening chest pain, coughing up blood, unexplained back/neck/jaw pain, severe abdominal pain, dizziness or lightheadedness, fainting, shortness of breath, sweaty or clammy skin, vomiting, or racing heart beat. These symptoms may represent a serious problem that is an emergency. Do not wait to see if the symptoms will go away. Get medical help right away. Call 911 and do not drive yourself to the hospital.

## 2024-08-30 NOTE — ED PROVIDER NOTE - PRO INTERPRETER NEED 2
30 day post procedure phone call completed;  No questions regarding medications or pain management. Continues to follow up with MD. Patient will continue to recommend [facility], no complaints of hospital stay, satisfied with care. Instructed patient to contact provider with any further questions or concerns. English

## 2024-08-30 NOTE — ED PROVIDER NOTE - OBJECTIVE STATEMENT
43yoF pmhx GERD, active smoking, presenting for leftsided chest pain since 8am while lying down, associated with nausea and one episode of vomiting. Patient's father  of a heart attack at 48yo, mother had large MI at 57yo. She finished a course of augmentin for sinusitis 3 days ago. Denies recent surgery or travel, sob, fever, chills, abdominal pain, diarrhea, rash, ocp use, or leg swelling

## 2024-08-30 NOTE — ED ADULT TRIAGE NOTE - CHIEF COMPLAINT QUOTE
Pt complaining of left sided chest pain radiating to left arm since this morning . denies cardiac hx. EKG done in triage

## 2024-08-30 NOTE — ED CDU PROVIDER DISPOSITION NOTE - PATIENT PORTAL LINK FT
You can access the FollowMyHealth Patient Portal offered by [facility] by registering at the following website: [email or web address]. By joining [employer]’s FollowMyHealth portal, you will also be able to view your health information using other applications (apps) compatible with our system.

## 2024-08-30 NOTE — ED PROVIDER NOTE - DIFFERENTIAL DIAGNOSIS
Acute coronary syndrome, Stable angina , Pneumonia Viral pleuritis. GERD.Costochondritis.Anxiety or panic disorder, Aortic dissection, myocarditis, pericarditis, zoster Differential Diagnosis

## 2024-08-30 NOTE — ED CDU PROVIDER INITIAL DAY NOTE - PROGRESS NOTE DETAILS
Patient states she wants to leave the emergency department and be discharged, myself and attending explained to patient that CCTA results have not been read by radiologist yet, therefore there is no interpretation and no definitive indication whether patient is having acute cardiac process at this time.  Patient endorsed understanding and states she wishes to leave at this time and have results sent to her after leaving the hospital. Patient with CCTA results indicating CAD RADS 3, patient returned to emergency department.  Cardiology consult requested.

## 2024-08-30 NOTE — ED ADULT NURSE NOTE - NSFALLUNIVINTERV_ED_ALL_ED
Bed/Stretcher in lowest position, wheels locked, appropriate side rails in place/Call bell, personal items and telephone in reach/Instruct patient to call for assistance before getting out of bed/chair/stretcher/Non-slip footwear applied when patient is off stretcher/[place] to call system/Physically safe environment - no spills, clutter or unnecessary equipment/Purposeful proactive rounding/Room/bathroom lighting operational, light cord in reach

## 2024-08-30 NOTE — ED PROVIDER NOTE - CLINICAL SUMMARY MEDICAL DECISION MAKING FREE TEXT BOX
43-year-old female here for evaluation of left-sided chest pain since 8 a.m. this morning.  She has no fever chills shortness of breath.  Patient does have a significant family history of coronary disease.  She has no PE risk factors.  CON: ao x 3, HENMT: neck supple,  CV: s1s2 ctab, RESP: cta b/l, GI:  soft, nontender, no rebound, no guarding, SKIN: no rash, MSK: no deformities, NEURO: no gross motor or sensory deficit Psychiatric: appropriate mood, appropriate affect    impression  initial trop <6, ekg wnl, placed in edou

## 2024-08-30 NOTE — ED CDU PROVIDER INITIAL DAY NOTE - OBJECTIVE STATEMENT
43yoF pmhx GERD, active smoking, presenting for leftsided chest pain since 8am while lying down, associated with nausea and one episode of vomiting. Patient's father  of a heart attack at 50yo, mother had large MI at 57yo. She finished a course of augmentin for sinusitis 3 days ago. Denies recent surgery or travel, sob, fever, chills, abdominal pain, diarrhea, rash, ocp use, or leg swelling

## 2024-08-30 NOTE — ED PROVIDER NOTE - PROGRESS NOTE DETAILS
CHARLES-- on reeval, pt feeling better after toradol IM, agrees with plan for CCTA today AY: signed out to MAR, MAR to follow 3rd trop and serial ekg.

## 2024-08-30 NOTE — ED PROVIDER NOTE - EKG/XRAY ADDITIONAL INFORMATION
EKG Interpretation by Dr. Kehinde Frazier: normal  EKG: NSR, nml axis, normal intervals, no ST Elevations   Independent interpretation of the chest  film performed by: Dr. Kehinde Frazier: VICTORIANO

## 2024-08-30 NOTE — ED CDU PROVIDER INITIAL DAY NOTE - ATTENDING APP SHARED VISIT CONTRIBUTION OF CARE
43-year-old female here for evaluation of left-sided chest pain since 8 a.m. this morning.  She has no fever chills shortness of breath.  Patient does have a significant family history of coronary disease.  She has no PE risk factors.  CON: ao x 3, HENMT: neck supple,  CV: s1s2 ctab, RESP: cta b/l, GI:  soft, nontender, no rebound, no guarding, SKIN: no rash, MSK: no deformities, NEURO: no gross motor or sensory deficit Psychiatric: appropriate mood, appropriate affect    impression  initial trop <6, ekg wnl, cxray wnl ccta CAD rad 3, pt pending cardio consult

## 2024-08-31 ENCOUNTER — TRANSCRIPTION ENCOUNTER (OUTPATIENT)
Age: 44
End: 2024-08-31

## 2024-08-31 VITALS
HEART RATE: 70 BPM | TEMPERATURE: 98 F | DIASTOLIC BLOOD PRESSURE: 84 MMHG | OXYGEN SATURATION: 98 % | RESPIRATION RATE: 18 BRPM | SYSTOLIC BLOOD PRESSURE: 147 MMHG

## 2024-08-31 LAB
A1C WITH ESTIMATED AVERAGE GLUCOSE RESULT: 4.7 % — SIGNIFICANT CHANGE UP (ref 4–5.6)
ALBUMIN SERPL ELPH-MCNC: 4.4 G/DL — SIGNIFICANT CHANGE UP (ref 3.5–5.2)
ALP SERPL-CCNC: 87 U/L — SIGNIFICANT CHANGE UP (ref 30–115)
ALT FLD-CCNC: 106 U/L — HIGH (ref 0–41)
ANION GAP SERPL CALC-SCNC: 13 MMOL/L — SIGNIFICANT CHANGE UP (ref 7–14)
AST SERPL-CCNC: 146 U/L — HIGH (ref 0–41)
BASOPHILS # BLD AUTO: 0.06 K/UL — SIGNIFICANT CHANGE UP (ref 0–0.2)
BASOPHILS NFR BLD AUTO: 0.9 % — SIGNIFICANT CHANGE UP (ref 0–1)
BILIRUB SERPL-MCNC: 1.1 MG/DL — SIGNIFICANT CHANGE UP (ref 0.2–1.2)
BUN SERPL-MCNC: 11 MG/DL — SIGNIFICANT CHANGE UP (ref 10–20)
CALCIUM SERPL-MCNC: 9 MG/DL — SIGNIFICANT CHANGE UP (ref 8.4–10.5)
CHLORIDE SERPL-SCNC: 101 MMOL/L — SIGNIFICANT CHANGE UP (ref 98–110)
CHOLEST SERPL-MCNC: 213 MG/DL — HIGH
CO2 SERPL-SCNC: 24 MMOL/L — SIGNIFICANT CHANGE UP (ref 17–32)
CREAT SERPL-MCNC: 0.8 MG/DL — SIGNIFICANT CHANGE UP (ref 0.7–1.5)
EGFR: 94 ML/MIN/1.73M2 — SIGNIFICANT CHANGE UP
EOSINOPHIL # BLD AUTO: 0.23 K/UL — SIGNIFICANT CHANGE UP (ref 0–0.7)
EOSINOPHIL NFR BLD AUTO: 3.4 % — SIGNIFICANT CHANGE UP (ref 0–8)
ESTIMATED AVERAGE GLUCOSE: 88 MG/DL — SIGNIFICANT CHANGE UP (ref 68–114)
GLUCOSE SERPL-MCNC: 77 MG/DL — SIGNIFICANT CHANGE UP (ref 70–99)
HCT VFR BLD CALC: 41.3 % — SIGNIFICANT CHANGE UP (ref 37–47)
HDLC SERPL-MCNC: 42 MG/DL — LOW
HGB BLD-MCNC: 14.3 G/DL — SIGNIFICANT CHANGE UP (ref 12–16)
IMM GRANULOCYTES NFR BLD AUTO: 0.4 % — HIGH (ref 0.1–0.3)
LIPID PNL WITH DIRECT LDL SERPL: 131 MG/DL — HIGH
LYMPHOCYTES # BLD AUTO: 2.19 K/UL — SIGNIFICANT CHANGE UP (ref 1.2–3.4)
LYMPHOCYTES # BLD AUTO: 32.1 % — SIGNIFICANT CHANGE UP (ref 20.5–51.1)
MAGNESIUM SERPL-MCNC: 2.1 MG/DL — SIGNIFICANT CHANGE UP (ref 1.8–2.4)
MCHC RBC-ENTMCNC: 29.9 PG — SIGNIFICANT CHANGE UP (ref 27–31)
MCHC RBC-ENTMCNC: 34.6 G/DL — SIGNIFICANT CHANGE UP (ref 32–37)
MCV RBC AUTO: 86.2 FL — SIGNIFICANT CHANGE UP (ref 81–99)
MONOCYTES # BLD AUTO: 0.45 K/UL — SIGNIFICANT CHANGE UP (ref 0.1–0.6)
MONOCYTES NFR BLD AUTO: 6.6 % — SIGNIFICANT CHANGE UP (ref 1.7–9.3)
NEUTROPHILS # BLD AUTO: 3.87 K/UL — SIGNIFICANT CHANGE UP (ref 1.4–6.5)
NEUTROPHILS NFR BLD AUTO: 56.6 % — SIGNIFICANT CHANGE UP (ref 42.2–75.2)
NON HDL CHOLESTEROL: 171 MG/DL — HIGH
NRBC # BLD: 0 /100 WBCS — SIGNIFICANT CHANGE UP (ref 0–0)
PHOSPHATE SERPL-MCNC: 3.5 MG/DL — SIGNIFICANT CHANGE UP (ref 2.1–4.9)
PLATELET # BLD AUTO: 204 K/UL — SIGNIFICANT CHANGE UP (ref 130–400)
PMV BLD: 11.6 FL — HIGH (ref 7.4–10.4)
POTASSIUM SERPL-MCNC: 4.4 MMOL/L — SIGNIFICANT CHANGE UP (ref 3.5–5)
POTASSIUM SERPL-SCNC: 4.4 MMOL/L — SIGNIFICANT CHANGE UP (ref 3.5–5)
PROT SERPL-MCNC: 6.5 G/DL — SIGNIFICANT CHANGE UP (ref 6–8)
RBC # BLD: 4.79 M/UL — SIGNIFICANT CHANGE UP (ref 4.2–5.4)
RBC # FLD: 13.2 % — SIGNIFICANT CHANGE UP (ref 11.5–14.5)
SODIUM SERPL-SCNC: 138 MMOL/L — SIGNIFICANT CHANGE UP (ref 135–146)
TRIGL SERPL-MCNC: 199 MG/DL — HIGH
TSH SERPL-MCNC: 2.03 UIU/ML — SIGNIFICANT CHANGE UP (ref 0.27–4.2)
WBC # BLD: 6.83 K/UL — SIGNIFICANT CHANGE UP (ref 4.8–10.8)
WBC # FLD AUTO: 6.83 K/UL — SIGNIFICANT CHANGE UP (ref 4.8–10.8)

## 2024-08-31 PROCEDURE — 99222 1ST HOSP IP/OBS MODERATE 55: CPT

## 2024-08-31 PROCEDURE — 99235 HOSP IP/OBS SAME DATE MOD 70: CPT

## 2024-08-31 PROCEDURE — 93010 ELECTROCARDIOGRAM REPORT: CPT

## 2024-08-31 RX ORDER — AMLODIPINE BESYLATE 10 MG/1
2.5 TABLET ORAL DAILY
Refills: 0 | Status: DISCONTINUED | OUTPATIENT
Start: 2024-08-31 | End: 2024-09-02

## 2024-08-31 RX ORDER — AMLODIPINE BESYLATE 10 MG/1
2.5 TABLET ORAL ONCE
Refills: 0 | Status: COMPLETED | OUTPATIENT
Start: 2024-08-31 | End: 2024-08-31

## 2024-08-31 RX ORDER — ENOXAPARIN SODIUM 100 MG/ML
40 INJECTION SUBCUTANEOUS EVERY 24 HOURS
Refills: 0 | Status: DISCONTINUED | OUTPATIENT
Start: 2024-08-31 | End: 2024-09-02

## 2024-08-31 RX ORDER — ALPRAZOLAM 0.25 MG
1 TABLET ORAL
Refills: 0 | DISCHARGE

## 2024-08-31 RX ORDER — AMLODIPINE BESYLATE 10 MG/1
1 TABLET ORAL
Qty: 30 | Refills: 2
Start: 2024-08-31 | End: 2024-11-28

## 2024-08-31 RX ORDER — OXYCODONE AND ACETAMINOPHEN 7.5; 325 MG/1; MG/1
1 TABLET ORAL
Refills: 0 | DISCHARGE

## 2024-08-31 RX ORDER — ASPIRIN 81 MG
1 TABLET, DELAYED RELEASE (ENTERIC COATED) ORAL
Qty: 30 | Refills: 2
Start: 2024-08-31 | End: 2024-11-28

## 2024-08-31 RX ADMIN — Medication 80 MILLIGRAM(S): at 00:07

## 2024-08-31 RX ADMIN — AMLODIPINE BESYLATE 2.5 MILLIGRAM(S): 10 TABLET ORAL at 09:42

## 2024-08-31 RX ADMIN — Medication 0.25 MILLIGRAM(S): at 02:45

## 2024-08-31 NOTE — DISCHARGE NOTE NURSING/CASE MANAGEMENT/SOCIAL WORK - PATIENT PORTAL LINK FT
You can access the FollowMyHealth Patient Portal offered by [facility] by registering at the following website: [email or web address]. By joining [employer]’s FollowMyHealth portal, you will also be able to view your health information using other applications (apps) compatible with our system.

## 2024-08-31 NOTE — DISCHARGE NOTE PROVIDER - NSDCMRMEDTOKEN_GEN_ALL_CORE_FT
Percocet 5 mg-325 mg oral tablet: 1 tab(s) orally 2 times a day as needed for  moderate pain  Xanax 0.25 mg oral tablet: 1 tab(s) orally once a day as needed for  anxiety   amLODIPine 2.5 mg oral tablet: 1 tab(s) orally once a day  aspirin 81 mg oral delayed release tablet: 1 tab(s) orally once a day  atorvastatin 40 mg oral tablet: 1 tab(s) orally once a day (at bedtime)  nicotine 14 mg/24 hr transdermal film, extended release: 1 patch transdermally once a day  Xanax 0.25 mg oral tablet: 1 tab(s) orally once a day as needed for  anxiety

## 2024-08-31 NOTE — ED ADULT NURSE REASSESSMENT NOTE - NS ED NURSE REASSESS COMMENT FT1
Patient reassessed. Patient admitted to TELE. A&O x4. VSS. IVL in place. Denies pain/discomfort. Safety & comfort measures maintained. Plan of care on going.

## 2024-08-31 NOTE — CONSULT NOTE ADULT - SUBJECTIVE AND OBJECTIVE BOX
Cardiologist: Dr. Jose J Spears    Cardiology Consult HPI:  44 yo F w obesity, tobacco use disorder, anxiety presented with CP for 10 mins. She reports left sided chest pain described as a sharp "tickle" that started when she layed down in bed, 10/10 in intensity, lasted for 10 mins and started to get better on her way to the ED.  Pain was followed by nausea and one episode of vomiting but no associated diaphoresis, SOB, dizziness or syncope. She denies any exertional chest discomfort however has had a recent URI 2 weeks ago and has had some DE GUZMAN since then.     +ve family h/o premature ASCVD .       PAST MEDICAL & SURGICAL HISTORY      FAMILY HISTORY:  FAMILY HISTORY:    [ ] no pertinent family history of premature cardiovascular disease in first degree relatives.  Mother:   Father:   Siblings:     SOCIAL HISTORY:  []smoker  []Alcohol  []Drug    ALLERGIES:  No Known Allergies      MEDICATIONS:  MEDICATIONS  (STANDING):  aspirin enteric coated 81 milliGRAM(s) Oral daily  atorvastatin 80 milliGRAM(s) Oral at bedtime  famotidine    Tablet 40 milliGRAM(s) Oral daily    MEDICATIONS  (PRN):  ALPRAZolam 0.25 milliGRAM(s) Oral every 12 hours PRN anxiety  oxycodone    5 mG/acetaminophen 325 mG 1 Tablet(s) Oral every 6 hours PRN Moderate Pain (4 - 6)      HOME MEDICATIONS:  Home Medications:      VITALS:   T(F): 98 (08-31 @ 00:16), Max: 98.5 (08-30 @ 09:24)  HR: 80 (08-31 @ 00:16) (78 - 80)  BP: 136/86 (08-31 @ 00:16) (136/86 - 141/91)  BP(mean): 102 (08-31 @ 00:16) (102 - 102)  RR: 17 (08-31 @ 00:16) (17 - 17)  SpO2: 99% (08-31 @ 00:16) (99% - 100%)    I&O's Summary      REVIEW OF SYSTEMS:    Negative except as mentioned in HPI    CONSTITUTIONAL: No weakness, fevers or chills  EYES: No visual changes  ENT: No vertigo or throat pain   NECK: No pain or stiffness  RESPIRATORY: No cough, wheezing, hemoptysis; No shortness of breath  CARDIOVASCULAR: No chest pain or palpitations  GASTROINTESTINAL: No abdominal or epigastric pain. No nausea, vomiting, or hematemesis; No diarrhea or constipation. No melena or hematochezia.  GENITOURINARY: No dysuria, frequency or hematuria  NEUROLOGICAL: No numbness or weakness  SKIN: No itching, no rashes  MSK: FROM x4    PHYSICAL EXAM:  NEURO: Awake , alert and oriented  GEN: Not in acute distress  NECK: No JVD  LUNGS: Clear to auscultation bilaterally   CARDIOVASCULAR: S1/S2 present, RRR , no murmurs or rubs, no carotid bruits,  + PP bilaterally  ABD: Soft, non-tender, non-distended, +BS  EXT: No JAVI  SKIN: Intact    LABS:                        14.3   6.50  )-----------( 237      ( 30 Aug 2024 10:20 )             41.2     08-30    138  |  103  |  13  ----------------------------<  93  4.3   |  26  |  0.7    Ca    9.6      30 Aug 2024 10:20    TPro  6.8  /  Alb  4.6  /  TBili  0.5  /  DBili  x   /  AST  14  /  ALT  25  /  AlkPhos  86  08-30              Troponin trend:          RADIOLOGY:  -CXR:  -TTE:  -CCTA:  -STRESS TEST:  -CATHETERIZATION:    ECG:    TELEMETRY EVENTS:   Cardiologist: Dr. Jose J Spears    Cardiology Consult HPI:  42 yo F w obesity, tobacco use disorder, anxiety presented with CP for 10 mins. She reports left sided chest pain described as a sharp "tickle" that started when she layed down in bed, 10/10 in intensity, lasted for 10 mins and started to get better on her way to the ED.  Pain was followed by nausea and one episode of vomiting but no associated diaphoresis, SOB, dizziness or syncope. She denies any exertional chest discomfort however has had a recent URI 2 weeks ago and has had some DE GUZMAN since then.     +ve family h/o premature ASCVD .       PAST MEDICAL & SURGICAL HISTORY  None    FAMILY HISTORY:  FAMILY HISTORY:    [ ] no pertinent family history of premature cardiovascular disease in first degree relatives.  Mother:   Father:   Siblings:     SOCIAL HISTORY:  []smoker  []Alcohol  []Drug    ALLERGIES:  No Known Allergies      MEDICATIONS:  MEDICATIONS  (STANDING):  aspirin enteric coated 81 milliGRAM(s) Oral daily  atorvastatin 80 milliGRAM(s) Oral at bedtime  famotidine    Tablet 40 milliGRAM(s) Oral daily    MEDICATIONS  (PRN):  ALPRAZolam 0.25 milliGRAM(s) Oral every 12 hours PRN anxiety  oxycodone    5 mG/acetaminophen 325 mG 1 Tablet(s) Oral every 6 hours PRN Moderate Pain (4 - 6)      HOME MEDICATIONS:  Home Medications:      VITALS:   T(F): 98 (08-31 @ 00:16), Max: 98.5 (08-30 @ 09:24)  HR: 80 (08-31 @ 00:16) (78 - 80)  BP: 136/86 (08-31 @ 00:16) (136/86 - 141/91)  BP(mean): 102 (08-31 @ 00:16) (102 - 102)  RR: 17 (08-31 @ 00:16) (17 - 17)  SpO2: 99% (08-31 @ 00:16) (99% - 100%)    I&O's Summary      REVIEW OF SYSTEMS:    Negative except as mentioned in HPI    CONSTITUTIONAL: No weakness, fevers or chills  EYES: No visual changes  ENT: No vertigo or throat pain   NECK: No pain or stiffness  RESPIRATORY: No cough, wheezing, hemoptysis; No shortness of breath  CARDIOVASCULAR: No chest pain or palpitations  GASTROINTESTINAL: No abdominal or epigastric pain. No nausea, vomiting, or hematemesis; No diarrhea or constipation. No melena or hematochezia.  GENITOURINARY: No dysuria, frequency or hematuria  NEUROLOGICAL: No numbness or weakness  SKIN: No itching, no rashes  MSK: FROM x4    10 point ROS completed; negative except as stated in HPI    PHYSICAL EXAM:  NEURO: Awake , alert and oriented  GEN: Not in acute distress, anxious  NECK: No JVD, supple  LUNGS: Clear to auscultation bilaterally, no crackles  CARDIOVASCULAR: S1/S2 present, RRR , no murmurs or rubs, no carotid bruits,  + PP bilaterally  ABD: Soft, non-tender, non-distended, +BS  EXT: No JAVI, warm  SKIN: Intact, no rash    LABS:                        14.3   6.50  )-----------( 237      ( 30 Aug 2024 10:20 )             41.2     08-30    138  |  103  |  13  ----------------------------<  93  4.3   |  26  |  0.7    Ca    9.6      30 Aug 2024 10:20    TPro  6.8  /  Alb  4.6  /  TBili  0.5  /  DBili  x   /  AST  14  /  ALT  25  /  AlkPhos  86  08-30              Troponin trend:          RADIOLOGY:  -CXR:  -TTE:  -CCTA:  -STRESS TEST:  -CATHETERIZATION:    ECG:    TELEMETRY EVENTS:

## 2024-08-31 NOTE — H&P ADULT - ASSESSMENT
Mild to moderate CAD   -Chest pain sounds more non-cardiac and ACS has been ruled out   -HTN  -obesity, tobacco use disorder, +ve family h/o premature ASCVD      Recommendations:   -medical management of CAD -start aspirin 81 mg and atorvastatin 40 mg daily   -check lipid profile, TSH and hba1c  -start amlodipine 2.5 mg daily for goal BP <120/80 mmHg   -nicotine patch for smoking cessation   -advised heart healthy diet and regular exercise   -outpatient follow up with Dr. Leora Spears    44 yo F w obesity, tobacco use disorder, anxiety, chronic back pain, presented with CP for 10 mins. She reports left sided chest pain described as a sharp "tickle" that started when she layed down in bed, 10/10 in intensity, lasted for 10 mins and started to get better on her way to the ED.  Pain was followed by nausea and one episode of vomiting but no associated diaphoresis, SOB, dizziness or syncope. Of note she has had a recent URI with reisdual cough. She denies any exertional chest discomfort however has had a recent URI 2 weeks ago and has had some DE GUZMAN since then. family history significant for premature CAD.       #Mild to moderate CAD   #HTN  - VS on admission  - labs: hST trend: <6 x3  cbc cmp wnl  - EKG-NSR, no ST-T changes  - Imaging;  CCTA:   Left Main Artery: Patent with no evidence of plaque or stenosis.  Left Anterior Descending Artery: Multifocal calcified plaque within the   proximal/mid LAD resulting in up to mild narrowing.  Ramus: Focal noncalcified plaque within the proximal segment resulting in   moderate narrowing.  Left Circumflex Artery: Focal noncalcified plaque at the origin of the   obtuse marginal branch resulting in mild narrowing  Right Coronary Artery: Patent with no evidence of plaque or stenosis.  -Chest pain sounds more non-cardiac and ACS has been ruled out, no chest pain on palpation to suggest trietz syndrome    -obesity, tobacco use disorder, +ve family h/o premature ASCVD  - Cardio: medical management     Plan:  -start aspirin 81 mg and atorvastatin 40 mg daily   -check lipid profile, TSH and hba1c  -start amlodipine 2.5 mg daily for goal BP <120/80 mmHg   - telemetry monitoring   -outpatient follow up with Dr. Leora Spears     #Active smoker  #Anxiety, chronic back pain  - nicotine patch  - c/w home meds     Dispo: Telemetry   Full code  DVT proph: lovenox     Pending: Telemetry monitoring, dc in AM

## 2024-08-31 NOTE — DISCHARGE NOTE PROVIDER - PROVIDER TOKENS
PROVIDER:[TOKEN:[[id number]],FOLLOWUP:[2 weeks]],PROVIDER:[TOKEN:[[id number]],FOLLOWUP:[2 weeks]]

## 2024-08-31 NOTE — DISCHARGE NOTE PROVIDER - NSDCCPCAREPLAN_GEN_ALL_CORE_FT
PRINCIPAL DISCHARGE DIAGNOSIS  Diagnosis: Chest pain  Assessment and Plan of Treatment: You were admitted to the hospital for chest pain.  Cardiac workup was complete for you. Your troponin levels were negative and EKG did not show any acute ischemic changes. You were ruled out for ACS.  CCTA showed mild narrowing in the Left Anterior Descending Artery, moderate narrowing in the Ramus, and mild narrowing in the left circumflex artery. Please follow up with Dr. Spears for further workup.  Chest Pain  Chest pain can be caused by many different conditions which may or may not be dangerous. Causes include heartburn, lung infections, heart attack, blood clot in lungs, skin infections, strain or damage to muscle, cartilage, or bones, etc. In addition to a history and physical examination, an electrocardiogram (ECG) or other lab tests may have been performed to determine the cause of your chest pain. Follow up with your primary care provider or with a cardiologist as instructed.   SEEK IMMEDIATE MEDICAL CARE IF YOU HAVE ANY OF THE FOLLOWING SYMPTOMS: worsening chest pain, coughing up blood, unexplained back/neck/jaw pain, severe abdominal pain, dizziness or lightheadedness, fainting, shortness of breath, sweaty or clammy skin, vomiting, or racing heart beat. These symptoms may represent a serious problem that is an emergency. Do not wait to see if the symptoms will go away. Get medical help right away. Call 911 and do not drive yourself to the hospital.

## 2024-08-31 NOTE — H&P ADULT - ATTENDING COMMENTS
42 yo F w obesity, tobacco use disorder, anxiety, chronic back pain, presented with CP. CCTA noted CAD-RADs 3, moderate stenosis. Pt was evaluated by cardiology team. Plan ot start ASA, statin, and norvasc. Pt medically stable for discharge and follow up with cardio outpatient for further management.

## 2024-08-31 NOTE — DISCHARGE NOTE PROVIDER - NSDCFUSCHEDAPPT_GEN_ALL_CORE_FT
Chago Kurtz  [facility]  [address]  Scheduled Appointment: 09/17/2024    Luke Cardenas  [facility]  [address]   Scheduled Appointment: 09/20/2024

## 2024-08-31 NOTE — H&P ADULT - NSHPPHYSICALEXAM_GEN_ALL_CORE
T(C): 36.7 (08-31-24 @ 00:16), Max: 36.9 (08-30-24 @ 09:24)  HR: 80 (08-31-24 @ 00:16) (78 - 80)  BP: 136/86 (08-31-24 @ 00:16) (136/86 - 141/91)  RR: 17 (08-31-24 @ 00:16) (17 - 17)  SpO2: 99% (08-31-24 @ 00:16) (99% - 100%)    CONSTITUTIONAL: Well groomed, no apparent distress  EYES: PERRLA and symmetric, EOMI, No conjunctival or scleral injection, non-icteric  ENMT: Oral mucosa with moist membranes. Normal dentition; no pharyngeal injection or exudates             NECK: Supple, symmetric and without tracheal deviation   RESP: No respiratory distress, no use of accessory muscles; CTA b/l, no WRR  CV: RRR, +S1S2, no MRG; no JVD; no peripheral edema  GI: Soft, NT, ND, no rebound, no guarding; no palpable masses; no hepatosplenomegaly; no hernia palpated  LYMPH: No cervical LAD or tenderness; no axillary LAD or tenderness; no inguinal LAD or tenderness  MSK: Normal gait; No digital clubbing or cyanosis; examination of the (head/neck/spine/ribs/pelvis, RUE, LUE, RLE, LLE) without misalignment,            Normal ROM without pain, no spinal tenderness, normal muscle strength/tone  SKIN: No rashes or ulcers noted; no subcutaneous nodules or induration palpable  NEURO: CN II-XII intact; normal reflexes in upper and lower extremities, sensation intact in upper and lower extremities b/l to light touch   PSYCH: Appropriate insight/judgment; A+O x 3, mood and affect appropriate, recent/remote memory intact

## 2024-08-31 NOTE — H&P ADULT - NSHPLABSRESULTS_GEN_ALL_CORE
CBC:            14.3   6.50  )-----------( 237      ( 08-30-24 @ 10:20 )             41.2       Chem:         ( 08-30-24 @ 10:20 )    138  |  103  |  13  ----------------------------<  93  4.3   |  26  |  0.7      Liver Functions: ( 08-30-24 @ 10:20 )  Alb: 4.6 g/dL / Pro: 6.8 g/dL / ALK PHOS: 86 U/L / ALT: 25 U/L / AST: 14 U/L / GGT: x         Type & Screen:   Bilirubin: (08-30-24 @ 10:20)  Direct: x  / Indirect: x  / Total: 0.5    TSH:   T4:

## 2024-08-31 NOTE — CONSULT NOTE ADULT - ATTENDING COMMENTS
Briefly, 43 year old woman for whom Cardiology is consulted for an abnormal CCTA in the setting of chest pain.     VS, PE as above.    Telemetry, labs, imaging personally reviewed.     A/P: Patient presented with chest pain. Her troponins were negative and EKG did not show any acute ischemic changes. She was ruled out for ACS. She had a CCTA with results as above: CADRADS 3. The chest pain she came in with has resolved. We will treat her medically for CAD - aspirin and statin as above. Start amlodipine for HTN and anti-anginal therapy. I advised her to call 911 if her symptoms return and she verbalized understanding. She will follow up with her outpatient cardiologist within 1-2 weeks.     Cardiology will sign off.

## 2024-08-31 NOTE — CONSULT NOTE ADULT - ASSESSMENT
44 yo F w obesity, tobacco use disorder, anxiety presented with CP for 10 mins. She reports left sided chest pain described as a sharp "tickle" that started when she layed down in bed, 10/10 in intensity, lasted for 10 mins and started to get better on her way to the ED.  Pain was followed by nausea and one episode of vomiting but no associated diaphoresis, SOB, dizziness or syncope. She denies any exertional chest discomfort however has had a recent URI 2 weeks ago and has had some DE GUZMAN since then.     EKG-NSR, no ST-T changes  hST trend: <6 x3     CCTA:   Left Main Artery: Patent with no evidence of plaque or stenosis.    Left Anterior Descending Artery: Multifocal calcified plaque within the   proximal/mid LAD resulting in up to mild narrowing.    Ramus: Focal noncalcified plaque within the proximal segment resulting in   moderate narrowing.    Left Circumflex Artery: Focal noncalcified plaque at the origin of the   obtuse marginal branch resulting in mild narrowing    Right Coronary Artery: Patent with no evidence of plaque or stenosis.    Impression:   -Mild to moderate CAD   -Chest pain sounds more non-cardiac and ACS has been ruled out   -HTN  -obesity, tobacco use disorder, +ve family h/o premature ASCVD      Recommendations:   -medical management of CAD -start aspirin 81 mg and atorvastatin 40 mg daily   -check lipid profile, TSH and hba1c  -start amlodipine 2.5 mg daily for goal BP <120/80 mmHg   -nicotine patch for smoking cessation   -advised heart healthy diet and regular exercise   -outpatient follow up with Dr. Leora Spears     -Consult team to sign off, recall as needed      Incomplete note, Please refer to attending attestation for final recommendations.  42 yo F w obesity, tobacco use disorder, anxiety presented with CP for 10 mins. She reports left sided chest pain described as a sharp "tickle" that started when she layed down in bed, 10/10 in intensity, lasted for 10 mins and started to get better on her way to the ED.  Pain was followed by nausea and one episode of vomiting but no associated diaphoresis, SOB, dizziness or syncope. She denies any exertional chest discomfort however has had a recent URI 2 weeks ago and has had some DE GUZMAN since then.     EKG-NSR, no ST-T changes  hST trend: <6 x3     CCTA:   Left Main Artery: Patent with no evidence of plaque or stenosis.    Left Anterior Descending Artery: Multifocal calcified plaque within the   proximal/mid LAD resulting in up to mild narrowing.    Ramus: Focal noncalcified plaque within the proximal segment resulting in   moderate narrowing.    Left Circumflex Artery: Focal noncalcified plaque at the origin of the   obtuse marginal branch resulting in mild narrowing    Right Coronary Artery: Patent with no evidence of plaque or stenosis.    Impression:   -Mild to moderate CAD   -Chest pain sounds more non-cardiac and ACS has been ruled out   -HTN  -obesity, tobacco use disorder, +ve family h/o premature ASCVD      Recommendations:   -medical management of CAD -start aspirin 81 mg and atorvastatin 40 mg daily   -check lipid profile, TSH and hba1c  -start amlodipine 2.5 mg daily for goal BP <120/80 mmHg   -nicotine patch for smoking cessation   -advised heart healthy diet and regular exercise   -outpatient follow up with Dr. Leora Spears     -Consult team to sign off, recall as needed     Please refer to attending attestation for final recommendations.  42 yo F w obesity, tobacco use disorder, anxiety presented with CP for 10 mins. She reports left sided chest pain described as a sharp "tickle" that started when she layed down in bed, 10/10 in intensity, lasted for 10 mins and started to get better on her way to the ED.  Pain was followed by nausea and one episode of vomiting but no associated diaphoresis, SOB, dizziness or syncope. She denies any exertional chest discomfort however has had a recent URI 2 weeks ago and has had some DE GUZMAN since then.     EKG-NSR, no ST-T changes  hST trend: <6 x3     CCTA:   Left Main Artery: Patent with no evidence of plaque or stenosis.    Left Anterior Descending Artery: Multifocal calcified plaque within the   proximal/mid LAD resulting in up to mild narrowing.    Ramus: Focal noncalcified plaque within the proximal segment resulting in   moderate narrowing.    Left Circumflex Artery: Focal noncalcified plaque at the origin of the   obtuse marginal branch resulting in mild narrowing    Right Coronary Artery: Patent with no evidence of plaque or stenosis.    Impression:   -Mild to moderate CAD   -Chest pain sounds more non-cardiac and ACS has been ruled out   -HTN  -obesity, tobacco use disorder, +ve family h/o premature ASCVD      Recommendations:   -medical management of CAD -start aspirin 81 mg and atorvastatin 40 mg daily   -check lipid profile, TSH and hba1c  -start amlodipine 2.5 mg daily for goal BP <120/80 mmHg   -nicotine patch for smoking cessation   -advised heart healthy diet and regular exercise   -outpatient follow up with Dr. Spears     -Consult team to sign off, recall as needed     Please refer to attending attestation for final recommendations.

## 2024-08-31 NOTE — DISCHARGE NOTE PROVIDER - HOSPITAL COURSE
42 yo F w obesity, tobacco use disorder, anxiety, chronic back pain, presented with CP for 10 mins. She reports left sided chest pain described as a sharp "tickle" that started when she layed down in bed, 10/10 in intensity, lasted for 10 mins and started to get better on her way to the ED.  Pain was followed by nausea and one episode of vomiting but no associated diaphoresis, SOB, dizziness or syncope. Of note she has had a recent URI with reisdual cough. She denies any exertional chest discomfort however has had a recent URI 2 weeks ago and has had some DE GUZMAN since then. family history significant for premature CAD.     ED course:  - VS on admission  - Labs: hST trend: <6 x3  cbc cmp wnl  - EKG-NSR, no ST-T changes  - Imaging:  CCTA:   Left Main Artery: Patent with no evidence of plaque or stenosis.  Left Anterior Descending Artery: Multifocal calcified plaque within the proximal/mid LAD resulting in up to mild narrowing.  Ramus: Focal noncalcified plaque within the proximal segment resulting in moderate narrowing.  Left Circumflex Artery: Focal noncalcified plaque at the origin of the obtuse marginal branch resulting in mild narrowing  Right Coronary Artery: Patent with no evidence of plaque or stenosis.    Admitted to medicine for further management.        #Mild to moderate CAD   #HTN  - VS on admission  - labs: hST trend: <6 x3  cbc cmp wnl  - EKG-NSR, no ST-T changes  - Imaging;  CCTA:   Left Main Artery: Patent with no evidence of plaque or stenosis.  Left Anterior Descending Artery: Multifocal calcified plaque within the   proximal/mid LAD resulting in up to mild narrowing.  Ramus: Focal noncalcified plaque within the proximal segment resulting in   moderate narrowing.  Left Circumflex Artery: Focal noncalcified plaque at the origin of the   obtuse marginal branch resulting in mild narrowing  Right Coronary Artery: Patent with no evidence of plaque or stenosis.  -Chest pain sounds more non-cardiac and ACS has been ruled out, no chest pain on palpation to suggest trietz syndrome    -obesity, tobacco use disorder, +ve family h/o premature ASCVD  - Cardio: medical management     Plan:  -start aspirin 81 mg and atorvastatin 40 mg daily   -check lipid profile, TSH and hba1c  -start amlodipine 2.5 mg daily for goal BP <120/80 mmHg   - telemetry monitoring   -outpatient follow up with Dr. Leora Spears     #Active smoker  #Anxiety, chronic back pain  - nicotine patch  - c/w home meds     Dispo: Telemetry   Full code  DVT proph: lovenox     Pending: Telemetry monitoring, dc in AM     42 yo F w obesity, tobacco use disorder, anxiety, chronic back pain, presented with CP for 10 mins. She reports left sided chest pain described as a sharp "tickle" that started when she layed down in bed, 10/10 in intensity, lasted for 10 mins and started to get better on her way to the ED.  Pain was followed by nausea and one episode of vomiting but no associated diaphoresis, SOB, dizziness or syncope. Of note she has had a recent URI with reisdual cough. She denies any exertional chest discomfort however has had a recent URI 2 weeks ago and has had some DE GUZMAN since then. family history significant for premature CAD.     ED course:  - VS on admission  - Labs: hST trend: <6 x3  cbc cmp wnl  - EKG-NSR, no ST-T changes  - Imaging:  CCTA:   Left Main Artery: Patent with no evidence of plaque or stenosis.  Left Anterior Descending Artery: Multifocal calcified plaque within the proximal/mid LAD resulting in up to mild narrowing.  Ramus: Focal noncalcified plaque within the proximal segment resulting in moderate narrowing.  Left Circumflex Artery: Focal noncalcified plaque at the origin of the obtuse marginal branch resulting in mild narrowing  Right Coronary Artery: Patent with no evidence of plaque or stenosis.    In the ED, pt received toradol, metoprolol. aspirin, and tylenol.  Pt was admitted to medicine for further management.        #Mild to moderate CAD   #HTN  - VS on admission  - labs: hST trend: <6 x3  cbc cmp wnl  - EKG-NSR, no ST-T changes  - Imaging;  CCTA:   Left Main Artery: Patent with no evidence of plaque or stenosis.  Left Anterior Descending Artery: Multifocal calcified plaque within the   proximal/mid LAD resulting in up to mild narrowing.  Ramus: Focal noncalcified plaque within the proximal segment resulting in   moderate narrowing.  Left Circumflex Artery: Focal noncalcified plaque at the origin of the   obtuse marginal branch resulting in mild narrowing  Right Coronary Artery: Patent with no evidence of plaque or stenosis.  -Chest pain sounds more non-cardiac and ACS has been ruled out, no chest pain on palpation to suggest trietz syndrome    -obesity, tobacco use disorder, +ve family h/o premature ASCVD  - Cardio: medical management     Plan:  -start aspirin 81 mg and atorvastatin 40 mg daily   -check lipid profile, TSH and hba1c  -start amlodipine 2.5 mg daily for goal BP <120/80 mmHg   - telemetry monitoring   -outpatient follow up with Dr. Leora Spears     #Active smoker  #Anxiety, chronic back pain  - nicotine patch  - c/w home meds     Dispo: Telemetry   Full code  DVT proph: lovenox     Pending: Telemetry monitoring, dc in AM     44 yo F w obesity, tobacco use disorder, anxiety, chronic back pain, presented with CP for 10 mins. She reports left sided chest pain described as a sharp "tickle" that started when she laid down in bed, 10/10 in intensity, lasted for 10 mins and started to get better on her way to the ED.  Pain was followed by nausea and one episode of vomiting but no associated diaphoresis, SOB, dizziness or syncope. Of note she has had a recent URI with residual cough. She denies any exertional chest discomfort however has had a recent URI 2 weeks ago and has had some DE GUZMAN since then. family history significant for premature CAD.     ED course:  - VS on admission  - Labs: hST trend: <6 x3  cbc cmp wnl  - EKG-NSR, no ST-T changes  - Imaging:  CCTA:   Left Main Artery: Patent with no evidence of plaque or stenosis.  Left Anterior Descending Artery: Multifocal calcified plaque within the proximal/mid LAD resulting in up to mild narrowing.  Ramus: Focal noncalcified plaque within the proximal segment resulting in moderate narrowing.  Left Circumflex Artery: Focal noncalcified plaque at the origin of the obtuse marginal branch resulting in mild narrowing  Right Coronary Artery: Patent with no evidence of plaque or stenosis.    In the ED, pt received toradol, metoprolol. aspirin, and tylenol.  Pt was admitted to medicine for further management.        #Mild to moderate CAD   #HTN  - VS on admission  - labs: hST trend: <6 x3  cbc cmp wnl  - EKG-NSR, no ST-T changes  - Imaging;  CCTA:   Left Main Artery: Patent with no evidence of plaque or stenosis.  Left Anterior Descending Artery: Multifocal calcified plaque within the   proximal/mid LAD resulting in up to mild narrowing.  Ramus: Focal noncalcified plaque within the proximal segment resulting in   moderate narrowing.  Left Circumflex Artery: Focal noncalcified plaque at the origin of the   obtuse marginal branch resulting in mild narrowing  Right Coronary Artery: Patent with no evidence of plaque or stenosis.  -Chest pain sounds more non-cardiac and ACS has been ruled out, no chest pain on palpation to suggest trietz syndrome    -obesity, tobacco use disorder, +ve family h/o premature ASCVD  - Cardio: medical management   -start aspirin 81 mg and atorvastatin 40 mg daily   -check lipid profile, TSH and hba1c  -start amlodipine 2.5 mg daily for goal BP <120/80 mmHg   - telemetry monitoring   -outpatient follow up with Dr. Leora Spears     #Active smoker  #Anxiety, chronic back pain  - nicotine patch  - c/w home meds     Dispo: Telemetry   Full code  DVT proph: lovenox     Pending: Telemetry monitoring, dc in AM

## 2024-08-31 NOTE — DISCHARGE NOTE PROVIDER - CARE PROVIDERS DIRECT ADDRESSES
,DirectAddress_Unknown,[email or web address]

## 2024-08-31 NOTE — H&P ADULT - NSHPREVIEWOFSYSTEMS_GEN_ALL_CORE
CONSTITUTIONAL: No weakness, fevers or chills  EYES/ENT: No visual changes;  No vertigo or throat pain   NECK: No pain or stiffness  RESPIRATORY: as in hpi  CARDIOVASCULAR: as in hpi  GASTROINTESTINAL: No abdominal or epigastric pain. No nausea, vomiting, or hematemesis; No diarrhea or constipation. No melena or hematochezia.  GENITOURINARY: No dysuria, frequency or hematuria  NEUROLOGICAL: No numbness or weakness  SKIN: No itching, rashes

## 2024-08-31 NOTE — H&P ADULT - HISTORY OF PRESENT ILLNESS
42 yo F w obesity, tobacco use disorder, anxiety, chronic back pain, presented with CP for 10 mins. She reports left sided chest pain described as a sharp "tickle" that started when she layed down in bed, 10/10 in intensity, lasted for 10 mins and started to get better on her way to the ED.  Pain was followed by nausea and one episode of vomiting but no associated diaphoresis, SOB, dizziness or syncope. Of note she has had a recent URI with reisdual cough. She denies any exertional chest discomfort however has had a recent URI 2 weeks ago and has had some DE GUZMAN since then. family history significant for premature CAD.     ED course:  - VS on admission  - labs: hST trend: <6 x3  cbc cmp wnl  - EKG-NSR, no ST-T changes  - Imaging;  CCTA:   Left Main Artery: Patent with no evidence of plaque or stenosis.    Left Anterior Descending Artery: Multifocal calcified plaque within the   proximal/mid LAD resulting in up to mild narrowing.    Ramus: Focal noncalcified plaque within the proximal segment resulting in   moderate narrowing.    Left Circumflex Artery: Focal noncalcified plaque at the origin of the   obtuse marginal branch resulting in mild narrowing    Right Coronary Artery: Patent with no evidence of plaque or stenosis.    Admitted to medicine for further management.

## 2024-08-31 NOTE — DISCHARGE NOTE PROVIDER - CARE PROVIDER_API CALL
Luke Saldana  Internal Medicine  [address] [place] [address]  Phone: [phone]  Fax: [phone]  Follow Up Time: 2 weeks    Leora Spears  Cardiovascular Disease  [address] [place] [address], [facility] [address]  [place] [address]  Phone: [phone]  Fax: [phone]  Follow Up Time: 2 weeks

## 2024-09-03 ENCOUNTER — NON-APPOINTMENT (OUTPATIENT)
Age: 44
End: 2024-09-03

## 2024-09-03 PROBLEM — F41.9 ANXIETY DISORDER, UNSPECIFIED: Chronic | Status: ACTIVE | Noted: 2024-08-31

## 2024-09-11 ENCOUNTER — NON-APPOINTMENT (OUTPATIENT)
Age: 44
End: 2024-09-11

## 2024-09-11 ENCOUNTER — APPOINTMENT (OUTPATIENT)
Dept: CARDIOLOGY | Facility: CLINIC | Age: 44
End: 2024-09-11
Payer: COMMERCIAL

## 2024-09-11 VITALS
DIASTOLIC BLOOD PRESSURE: 84 MMHG | BODY MASS INDEX: 38.41 KG/M2 | SYSTOLIC BLOOD PRESSURE: 138 MMHG | HEART RATE: 80 BPM | WEIGHT: 183 LBS | HEIGHT: 58 IN

## 2024-09-11 DIAGNOSIS — I10 ESSENTIAL (PRIMARY) HYPERTENSION: ICD-10-CM

## 2024-09-11 DIAGNOSIS — R07.9 CHEST PAIN, UNSPECIFIED: ICD-10-CM

## 2024-09-11 PROCEDURE — 93000 ELECTROCARDIOGRAM COMPLETE: CPT

## 2024-09-11 PROCEDURE — 99214 OFFICE O/P EST MOD 30 MIN: CPT | Mod: 25

## 2024-09-11 RX ORDER — ROSUVASTATIN CALCIUM 20 MG/1
20 TABLET, FILM COATED ORAL DAILY
Qty: 60 | Refills: 2 | Status: ACTIVE | COMMUNITY
Start: 2024-09-11 | End: 1900-01-01

## 2024-09-11 NOTE — REASON FOR VISIT
[Initial Evaluation] : an initial evaluation of [FreeTextEntry1] : htn sob obesity cardiac evaluation no new complaints echo shows normal lv function bp stable on hctz anxiety symptoms  hyperventilating no syncope no palps  The patient now is 6 weeks pregnant and advised cardiac follow-up She presently denies cardiac symptoms.. Denies chest pain or exertional shortness of breath..  Her blood pressure is normal and she desires to stop her hydrochlorothiazide  EKG is normal sinus rhythm The patient had a CTA which revealed mild narrowing of the LAD and moderate lateral narrowing of the LCx with a calcium score of 156. She denies exertional chest pain. Her cholesterol is elevated and she is not tolerating statins and will be switched to Crestor   [FreeTextEntry2] : htn and atypical chest pain

## 2024-09-16 DIAGNOSIS — I25.10 ATHEROSCLEROTIC HEART DISEASE OF NATIVE CORONARY ARTERY WITHOUT ANGINA PECTORIS: ICD-10-CM

## 2024-09-16 DIAGNOSIS — F17.200 NICOTINE DEPENDENCE, UNSPECIFIED, UNCOMPLICATED: ICD-10-CM

## 2024-09-16 DIAGNOSIS — E66.9 OBESITY, UNSPECIFIED: ICD-10-CM

## 2024-09-16 DIAGNOSIS — F41.9 ANXIETY DISORDER, UNSPECIFIED: ICD-10-CM

## 2024-09-16 DIAGNOSIS — G89.29 OTHER CHRONIC PAIN: ICD-10-CM

## 2024-09-16 DIAGNOSIS — R07.9 CHEST PAIN, UNSPECIFIED: ICD-10-CM

## 2024-09-16 DIAGNOSIS — I10 ESSENTIAL (PRIMARY) HYPERTENSION: ICD-10-CM

## 2024-09-16 DIAGNOSIS — M54.9 DORSALGIA, UNSPECIFIED: ICD-10-CM

## 2024-09-17 ENCOUNTER — APPOINTMENT (OUTPATIENT)
Dept: NEUROLOGY | Facility: CLINIC | Age: 44
End: 2024-09-17
Payer: OTHER MISCELLANEOUS

## 2024-09-17 VITALS
SYSTOLIC BLOOD PRESSURE: 143 MMHG | HEART RATE: 76 BPM | DIASTOLIC BLOOD PRESSURE: 94 MMHG | HEIGHT: 58 IN | WEIGHT: 182 LBS | BODY MASS INDEX: 38.2 KG/M2

## 2024-09-17 DIAGNOSIS — R51.9 HEADACHE, UNSPECIFIED: ICD-10-CM

## 2024-09-17 PROCEDURE — 99204 OFFICE O/P NEW MOD 45 MIN: CPT

## 2024-09-17 RX ORDER — AMLODIPINE BESYLATE 2.5 MG/1
2.5 TABLET ORAL
Refills: 0 | Status: ACTIVE | COMMUNITY

## 2024-09-17 RX ORDER — ASPIRIN 81 MG
81 TABLET, DELAYED RELEASE (ENTERIC COATED) ORAL
Refills: 0 | Status: ACTIVE | COMMUNITY

## 2024-09-17 RX ORDER — ROSUVASTATIN CALCIUM 10 MG/1
10 TABLET, FILM COATED ORAL
Refills: 0 | Status: ACTIVE | COMMUNITY

## 2024-09-17 NOTE — REVIEW OF SYSTEMS
[Tension Headache] : tension-type headaches [Sleep Disturbances] : sleep disturbances [Arthralgias] : arthralgias [Numbness] : numbness [Tingling] : tingling

## 2024-09-17 NOTE — PHYSICAL EXAM
[FreeTextEntry1] :  PHYSICAL EXAMINATION: Head: Normocephalic, atraumatic. Negative TA tenderness/prominence.  Neck: Supple with full range of motion; nontender with negative bilateral Spurling's signs.  Spine: Full range of motion; nontender. Negative straight leg raise maneuvers.  Extremities: Non-tender. Atraumatic. Positive Tinel's signs.  NEUROLOGICAL EXAMINATION:  Mental Status: Patient is a good informant with intact orientation, attention, concentration, recent and remote memory. Language evaluation reveals no evidence of aphasia. Fund of knowledge is normal.  Cranial Nerves Cranial Nerves:  II, III, IV, VI: Pupils are equal, round, and reactive to light and accommodation. No evidence of afferent pupillary defect. Visual fields are full to confrontation. Eye movements are full without evidence of nystagmus or internuclear ophthalmoplegia. Funduscopic examination reveals sharp disc margins.  V: Normal jaw movements. Normal facial sensation.  VII: Normal facial motor testing.  VIII: Grossly normal hearing bilaterally.  IX, X: Palate moves symmetrically. No dysarthria.  XI: Normal shoulder shrug and sternocleidomastoid power.  XII: Tongue appears normal and protrudes in the midline.  Motor: Normal bulk, tone, and power throughout.  Muscle Stretch Reflexes (right/left): 2+ symmetrical. Positive Tinel's sign at both wrists.  Plantar Responses: Flexor bilaterally.  Coordination: Normal finger to nose and heel to shin testing, no truncal ataxia and no tremor.  Sensation: Normal primary sensation. Normal double simultaneous stimulation.  Gait and Station: Normal base, stride, and turning. Normal toe and heel walking. Normal tandem. Negative Romberg.

## 2024-09-17 NOTE — ASSESSMENT
[FreeTextEntry1] : Impression is that of posterior cervical trapezius pain which precipitates her headaches that start in the back of her head. I suggested that she continue physical therapy. She will take the Lyrica on a regular basis. 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 I also ordered EMG UEs to evaluate for bilateral carpal tunnel syndrome. Patient is continuing to be disabled and not able to return to work. We will see her back in follow up when workup is complete for reevaluation.     Total clinician time spent today on the patient is 45 minutes including preparing to see the patient, obtaining and/or reviewing and confirming history, performing medically necessary and appropriate examination, counseling and educating the patient and/or family, documenting clinical information in the EHR and communicating and/or referring to other healthcare professionals.    Entered by Vera Wiseman acting as scribe for Dr. Kurtz.   The documentation recorded by the scribe, in my presence, accurately reflects the service I personally performed, and the decisions made by me with my edits as appropriate. Chago Kurtz MD, FAAN, FACP Diplomate American Board of Psychiatry & Neurology.

## 2024-09-17 NOTE — HISTORY OF PRESENT ILLNESS
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 The power went out in the facility and a patient went into cardiac arrest. The patient had to position him in the bed to put the board under him to start CPR. Her patient weighed about 250 lbs and the bed could not be moved because the power was out so the patient had to move him into the vertical position and then pull him up so that she could get the board under him. Patient sustained injuries to her neck and back doing this. She did see Dr. Cardenas who discussed conservative regimen for her neck and back including meloxicam and Medrol tamera. Patient is also receiving Lyrica for her neck pain which does help her sleep. She also has numbness and tingling in both hands which is a new symptom. This also occasionally occurs in the legs.   Patient did have cervical and lumbar MRIs. Cervical MRI showed multilevel disc herniations at C3-4, C4-5, C5-6, and a disc bulge at C6-7. Lumbar MRI showed multiple disc bulges with no herniations. She has been getting physical therapy.   Patient has trouble sleeping secondary to headaches. Headaches emanate from the posterior cervical spine, over the head, and forward to the front of the head. She has seen Dr. 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 He did indicate that if she has trouble sleeping or new headaches, she should have the cyst checked again.   She has not returned to work following the injury.

## 2024-09-20 ENCOUNTER — APPOINTMENT (OUTPATIENT)
Dept: PAIN MANAGEMENT | Facility: CLINIC | Age: 44
End: 2024-09-20
Payer: OTHER MISCELLANEOUS

## 2024-09-20 DIAGNOSIS — M54.12 RADICULOPATHY, CERVICAL REGION: ICD-10-CM

## 2024-09-20 DIAGNOSIS — M54.50 LOW BACK PAIN, UNSPECIFIED: ICD-10-CM

## 2024-09-20 PROCEDURE — 99214 OFFICE O/P EST MOD 30 MIN: CPT

## 2024-09-20 NOTE — PHYSICAL EXAM
[de-identified] : BACK- Inspection:  erythema (-) ecchymosis (-)       Special Tests:  SLR: R (-) ; L (+)     Gait:  non- antalgic gait   Cervical Spine Exam:    Inspection:    erythema (-)   ecchymosis (-)     Palpation:                                                       Cervical paraspinal mm tenderness: R (-); L(-)   Upper trapezius mm tenderness:  R (-); L (-)    ROM: Full ROM      Special Testing:  Spurling Test:   R (+); L (-) BR reflexes intact BL.

## 2024-09-20 NOTE — HISTORY OF PRESENT ILLNESS
[FreeTextEntry1] : PRESENTING TODAY 09-: Patient presents to the office today for a follow up visit with continued neck, lower back, left shoulder pain after an injury that occurred at work on June 22, 2024. She continues with neck pain that is sharp, burning, tingling that radiates down the BL shoulders and arms into her hands and is 9/10 pain intensity associated with numbness and tingling. Cervical extension and lateral rotation improve the pain, cervical flexion worsens the pain. NSAIDs has been tried without relief. Patient denies bowel/bladder incontinence.  As for her low back pain that is sharp, burning, tingling that does radiates down the left worse than right leg that is 9/10 pain intensity associated with numbness and tingling. Sitting and bending worsens the pain, standing improves the pain. NSAIDs has been tried without relief. Patient denies bowel/bladder incontinence.

## 2024-09-20 NOTE — DISCUSSION/SUMMARY
[de-identified] : A discussion regarding available pain management treatment options occurred with the patient. These included interventional, rehabilitative, pharmacological, and alternative modalities. We will proceed with the following:  Interventional treatment options: 1. Patient will proceed with a TANISHA C7-T1  MAC. Treatment options were discussed with the patient. The patient has been having persistent neck pain with radiculopathy with minimal improvement with conservative therapies. The patient was given the option to proceed with a cervical epidural steroid injection to try to get some pain relief.  If we are unable to get pain relief with this procedure, we will reassess our options before proceeding.      The risks and benefits were discussed which included bleeding, infection, nerve injury, no pain relief or worse, increased pain. All questions were answered, and the patient will schedule for the injection on the next available date.  Risk, benefits, pros and cons of procedure were explained to the patient using models and diagrams and their questions were answered.  The patient has severe anxiety of procedures that necessitates monitored anesthesia care (MAC). The procedure performed will be close to major nerves, arteries, and spinal cord and/or joint structures. Due to the proximity of these structures, we need the patient to be still during the procedure.  With the help of MAC, this will be safely achieved and decrease the risk of any complications.   Rehabilitative options: - c/w participation in active HEP as discussed. I gave the patient a list of home exercises to do for pain reduction and overall improvement in functional status including but not limited to active neck rotation, active neck side bend, neck flexion, neck extension, chin tuck, scalene stretch, isometric neck flexion, isometric neck extension, isometric neck side bend, head lift/neck curl, head lift/neck side bend, neck extension on hands and knees, and scapula squeeze.   Medication based treatment options: - c/w Lyrica 50mg BID for a duration of 4 weeks.  - c/w oxycodone-acetaminophen 5-325mg BID for a duration of 2 weeks.   Complementary treatment options: - Patient was advised to stay away from any heavy lifting. If needed, she was advised to squat and not bend forward. - Physician directed activity and lifestyle modifications.  Follow up 1-2 weeks post injection for assessment of efficacy and further recommendations.  I, Judith Bolden, attest that this documentation has been prepared under the direction and in the presence of Provider Luke Cardenas, DO The documentation recorded by the scribe, in my presence, accurately reflects the service I personally performed, and the decisions made by me with my edits as appropriate.   Best Regards, Luke Cardenas D.O.

## 2024-10-08 DIAGNOSIS — E34.8 OTHER SPECIFIED ENDOCRINE DISORDERS: ICD-10-CM

## 2024-10-18 ENCOUNTER — APPOINTMENT (OUTPATIENT)
Dept: PAIN MANAGEMENT | Facility: CLINIC | Age: 44
End: 2024-10-18
Payer: OTHER MISCELLANEOUS

## 2024-10-18 DIAGNOSIS — M54.12 RADICULOPATHY, CERVICAL REGION: ICD-10-CM

## 2024-10-18 DIAGNOSIS — M54.50 LOW BACK PAIN, UNSPECIFIED: ICD-10-CM

## 2024-10-18 PROCEDURE — 99214 OFFICE O/P EST MOD 30 MIN: CPT

## 2024-10-29 ENCOUNTER — APPOINTMENT (OUTPATIENT)
Dept: NEUROLOGY | Facility: CLINIC | Age: 44
End: 2024-10-29

## 2024-11-06 ENCOUNTER — APPOINTMENT (OUTPATIENT)
Dept: NEUROLOGY | Facility: CLINIC | Age: 44
End: 2024-11-06
Payer: OTHER MISCELLANEOUS

## 2024-11-06 VITALS
SYSTOLIC BLOOD PRESSURE: 123 MMHG | BODY MASS INDEX: 38.2 KG/M2 | DIASTOLIC BLOOD PRESSURE: 83 MMHG | HEART RATE: 69 BPM | WEIGHT: 182 LBS | HEIGHT: 58 IN

## 2024-11-06 DIAGNOSIS — M54.50 LOW BACK PAIN, UNSPECIFIED: ICD-10-CM

## 2024-11-06 DIAGNOSIS — R51.9 HEADACHE, UNSPECIFIED: ICD-10-CM

## 2024-11-06 DIAGNOSIS — E34.8 OTHER SPECIFIED ENDOCRINE DISORDERS: ICD-10-CM

## 2024-11-06 PROCEDURE — 99214 OFFICE O/P EST MOD 30 MIN: CPT | Mod: ACP

## 2024-11-12 ENCOUNTER — NON-APPOINTMENT (OUTPATIENT)
Age: 44
End: 2024-11-12

## 2024-11-12 ENCOUNTER — APPOINTMENT (OUTPATIENT)
Dept: CARDIOLOGY | Facility: CLINIC | Age: 44
End: 2024-11-12
Payer: COMMERCIAL

## 2024-11-12 VITALS
SYSTOLIC BLOOD PRESSURE: 130 MMHG | BODY MASS INDEX: 37.99 KG/M2 | DIASTOLIC BLOOD PRESSURE: 80 MMHG | HEART RATE: 75 BPM | WEIGHT: 181 LBS | HEIGHT: 58 IN

## 2024-11-12 DIAGNOSIS — I10 ESSENTIAL (PRIMARY) HYPERTENSION: ICD-10-CM

## 2024-11-12 PROCEDURE — 93000 ELECTROCARDIOGRAM COMPLETE: CPT

## 2024-11-12 PROCEDURE — 99213 OFFICE O/P EST LOW 20 MIN: CPT | Mod: 25

## 2024-11-13 ENCOUNTER — APPOINTMENT (OUTPATIENT)
Dept: PAIN MANAGEMENT | Facility: CLINIC | Age: 44
End: 2024-11-13
Payer: OTHER MISCELLANEOUS

## 2024-11-13 ENCOUNTER — APPOINTMENT (OUTPATIENT)
Facility: CLINIC | Age: 44
End: 2024-11-13

## 2024-11-13 DIAGNOSIS — M54.12 RADICULOPATHY, CERVICAL REGION: ICD-10-CM

## 2024-11-13 PROCEDURE — 62321 NJX INTERLAMINAR CRV/THRC: CPT

## 2024-11-13 PROCEDURE — 00600 ANES PX CRV SPINE&CORD NOS: CPT | Mod: QZ,P2

## 2024-12-02 ENCOUNTER — APPOINTMENT (OUTPATIENT)
Dept: PAIN MANAGEMENT | Facility: CLINIC | Age: 44
End: 2024-12-02
Payer: OTHER MISCELLANEOUS

## 2024-12-02 DIAGNOSIS — M54.2 CERVICALGIA: ICD-10-CM

## 2024-12-02 DIAGNOSIS — M54.12 RADICULOPATHY, CERVICAL REGION: ICD-10-CM

## 2024-12-02 PROCEDURE — 99214 OFFICE O/P EST MOD 30 MIN: CPT

## 2024-12-03 ENCOUNTER — RESULT REVIEW (OUTPATIENT)
Age: 44
End: 2024-12-03

## 2024-12-03 ENCOUNTER — OUTPATIENT (OUTPATIENT)
Dept: OUTPATIENT SERVICES | Facility: HOSPITAL | Age: 44
LOS: 1 days | End: 2024-12-03
Payer: COMMERCIAL

## 2024-12-03 DIAGNOSIS — I10 ESSENTIAL (PRIMARY) HYPERTENSION: ICD-10-CM

## 2024-12-03 PROCEDURE — 78452 HT MUSCLE IMAGE SPECT MULT: CPT

## 2024-12-03 PROCEDURE — A9500: CPT

## 2024-12-03 PROCEDURE — 93018 CV STRESS TEST I&R ONLY: CPT

## 2024-12-03 PROCEDURE — 78452 HT MUSCLE IMAGE SPECT MULT: CPT | Mod: 26

## 2024-12-04 DIAGNOSIS — I10 ESSENTIAL (PRIMARY) HYPERTENSION: ICD-10-CM

## 2025-01-02 DIAGNOSIS — M54.16 RADICULOPATHY, LUMBAR REGION: ICD-10-CM

## 2025-01-20 ENCOUNTER — APPOINTMENT (OUTPATIENT)
Dept: NEUROLOGY | Facility: CLINIC | Age: 45
End: 2025-01-20

## 2025-05-23 ENCOUNTER — OUTPATIENT (OUTPATIENT)
Dept: OUTPATIENT SERVICES | Facility: HOSPITAL | Age: 45
LOS: 1 days | End: 2025-05-23
Payer: COMMERCIAL

## 2025-05-23 DIAGNOSIS — Z12.31 ENCOUNTER FOR SCREENING MAMMOGRAM FOR MALIGNANT NEOPLASM OF BREAST: ICD-10-CM

## 2025-05-23 DIAGNOSIS — R92.2 INCONCLUSIVE MAMMOGRAM: ICD-10-CM

## 2025-05-23 PROCEDURE — 76641 ULTRASOUND BREAST COMPLETE: CPT | Mod: 26,50

## 2025-05-23 PROCEDURE — 77063 BREAST TOMOSYNTHESIS BI: CPT | Mod: 26

## 2025-05-23 PROCEDURE — 77063 BREAST TOMOSYNTHESIS BI: CPT

## 2025-05-23 PROCEDURE — 76641 ULTRASOUND BREAST COMPLETE: CPT | Mod: 50

## 2025-05-23 PROCEDURE — 77067 SCR MAMMO BI INCL CAD: CPT | Mod: 26

## 2025-05-23 PROCEDURE — 77067 SCR MAMMO BI INCL CAD: CPT

## 2025-05-24 DIAGNOSIS — R92.2 INCONCLUSIVE MAMMOGRAM: ICD-10-CM

## 2025-05-24 DIAGNOSIS — Z12.31 ENCOUNTER FOR SCREENING MAMMOGRAM FOR MALIGNANT NEOPLASM OF BREAST: ICD-10-CM

## 2025-05-29 ENCOUNTER — APPOINTMENT (OUTPATIENT)
Dept: CARDIOLOGY | Facility: CLINIC | Age: 45
End: 2025-05-29

## 2025-07-14 ENCOUNTER — RX RENEWAL (OUTPATIENT)
Age: 45
End: 2025-07-14